# Patient Record
Sex: MALE | Race: BLACK OR AFRICAN AMERICAN | Employment: UNEMPLOYED | URBAN - METROPOLITAN AREA
[De-identification: names, ages, dates, MRNs, and addresses within clinical notes are randomized per-mention and may not be internally consistent; named-entity substitution may affect disease eponyms.]

---

## 2019-09-04 ENCOUNTER — APPOINTMENT (OUTPATIENT)
Dept: CT IMAGING | Facility: HOSPITAL | Age: 34
DRG: 064 | End: 2019-09-04
Attending: EMERGENCY MEDICINE

## 2019-09-04 ENCOUNTER — HOSPITAL ENCOUNTER (INPATIENT)
Facility: HOSPITAL | Age: 34
LOS: 9 days | Discharge: HOME | DRG: 064 | End: 2019-09-14
Attending: EMERGENCY MEDICINE | Admitting: HOSPITALIST

## 2019-09-04 DIAGNOSIS — D75.839 THROMBOCYTOSIS: ICD-10-CM

## 2019-09-04 DIAGNOSIS — G08 CEREBRAL VENOUS SINUS THROMBOSIS: ICD-10-CM

## 2019-09-04 DIAGNOSIS — R47.01 APHASIA: Primary | ICD-10-CM

## 2019-09-04 DIAGNOSIS — I63.9 ISCHEMIC STROKE DIAGNOSED DURING CURRENT ADMISSION (HCC): ICD-10-CM

## 2019-09-04 LAB
ALBUMIN SERPL-MCNC: 3.9 G/DL (ref 3.4–5)
ALBUMIN/GLOB SERPL: 1.1 {RATIO} (ref 1–2)
ALP LIVER SERPL-CCNC: 69 U/L (ref 45–117)
ALT SERPL-CCNC: 35 U/L (ref 16–61)
ANION GAP SERPL CALC-SCNC: 7 MMOL/L (ref 0–18)
APTT PPP: 28.4 SECONDS (ref 25.4–36.1)
AST SERPL-CCNC: 17 U/L (ref 15–37)
BASOPHILS # BLD AUTO: 0.06 X10(3) UL (ref 0–0.2)
BASOPHILS NFR BLD AUTO: 0.6 %
BILIRUB SERPL-MCNC: 0.8 MG/DL (ref 0.1–2)
BILIRUB UR QL STRIP.AUTO: NEGATIVE
BUN BLD-MCNC: 12 MG/DL (ref 7–18)
BUN/CREAT SERPL: 7.9 (ref 10–20)
CALCIUM BLD-MCNC: 9.4 MG/DL (ref 8.5–10.1)
CHLORIDE SERPL-SCNC: 106 MMOL/L (ref 98–112)
CO2 SERPL-SCNC: 26 MMOL/L (ref 21–32)
COLOR UR AUTO: YELLOW
CREAT BLD-MCNC: 1.51 MG/DL (ref 0.7–1.3)
DEPRECATED RDW RBC AUTO: 44.5 FL (ref 35.1–46.3)
EOSINOPHIL # BLD AUTO: 0.03 X10(3) UL (ref 0–0.7)
EOSINOPHIL NFR BLD AUTO: 0.3 %
ERYTHROCYTE [DISTWIDTH] IN BLOOD BY AUTOMATED COUNT: 14.4 % (ref 11–15)
GLOBULIN PLAS-MCNC: 3.7 G/DL (ref 2.8–4.4)
GLUCOSE BLD-MCNC: 113 MG/DL (ref 70–99)
GLUCOSE BLD-MCNC: 94 MG/DL (ref 70–99)
GLUCOSE UR STRIP.AUTO-MCNC: NEGATIVE MG/DL
HCT VFR BLD AUTO: 45.2 % (ref 39–53)
HGB BLD-MCNC: 15.1 G/DL (ref 13–17.5)
IMM GRANULOCYTES # BLD AUTO: 0.03 X10(3) UL (ref 0–1)
IMM GRANULOCYTES NFR BLD: 0.3 %
INR BLD: 1.05 (ref 0.9–1.1)
KETONES UR STRIP.AUTO-MCNC: 20 MG/DL
LEUKOCYTE ESTERASE UR QL STRIP.AUTO: NEGATIVE
LYMPHOCYTES # BLD AUTO: 1.62 X10(3) UL (ref 1–4)
LYMPHOCYTES NFR BLD AUTO: 15.9 %
M PROTEIN MFR SERPL ELPH: 7.6 G/DL (ref 6.4–8.2)
MCH RBC QN AUTO: 28.6 PG (ref 26–34)
MCHC RBC AUTO-ENTMCNC: 33.4 G/DL (ref 31–37)
MCV RBC AUTO: 85.6 FL (ref 80–100)
MONOCYTES # BLD AUTO: 0.91 X10(3) UL (ref 0.1–1)
MONOCYTES NFR BLD AUTO: 8.9 %
NEUTROPHILS # BLD AUTO: 7.55 X10 (3) UL (ref 1.5–7.7)
NEUTROPHILS # BLD AUTO: 7.55 X10(3) UL (ref 1.5–7.7)
NEUTROPHILS NFR BLD AUTO: 74 %
NITRITE UR QL STRIP.AUTO: NEGATIVE
OSMOLALITY SERPL CALC.SUM OF ELEC: 289 MOSM/KG (ref 275–295)
PH UR STRIP.AUTO: 5 [PH] (ref 4.5–8)
PLATELET # BLD AUTO: 579 10(3)UL (ref 150–450)
POTASSIUM SERPL-SCNC: 4.3 MMOL/L (ref 3.5–5.1)
PROT UR STRIP.AUTO-MCNC: 30 MG/DL
PSA SERPL DL<=0.01 NG/ML-MCNC: 14.1 SECONDS (ref 12.5–14.7)
RBC # BLD AUTO: 5.28 X10(6)UL (ref 3.8–5.8)
RBC #/AREA URNS AUTO: >10 /HPF
SODIUM SERPL-SCNC: 139 MMOL/L (ref 136–145)
SP GR UR STRIP.AUTO: 1.02 (ref 1–1.03)
TROPONIN I SERPL-MCNC: <0.045 NG/ML (ref ?–0.04)
UROBILINOGEN UR STRIP.AUTO-MCNC: <2 MG/DL
WBC # BLD AUTO: 10.2 X10(3) UL (ref 4–11)

## 2019-09-04 PROCEDURE — 99223 1ST HOSP IP/OBS HIGH 75: CPT | Performed by: HOSPITALIST

## 2019-09-04 PROCEDURE — 70450 CT HEAD/BRAIN W/O DYE: CPT | Performed by: EMERGENCY MEDICINE

## 2019-09-05 ENCOUNTER — APPOINTMENT (OUTPATIENT)
Dept: MRI IMAGING | Facility: HOSPITAL | Age: 34
DRG: 064 | End: 2019-09-05
Attending: NURSE PRACTITIONER

## 2019-09-05 ENCOUNTER — APPOINTMENT (OUTPATIENT)
Dept: MRI IMAGING | Facility: HOSPITAL | Age: 34
DRG: 064 | End: 2019-09-05
Attending: EMERGENCY MEDICINE

## 2019-09-05 ENCOUNTER — APPOINTMENT (OUTPATIENT)
Dept: CV DIAGNOSTICS | Facility: HOSPITAL | Age: 34
DRG: 064 | End: 2019-09-05
Attending: NURSE PRACTITIONER

## 2019-09-05 LAB
AMPHET UR QL SCN: NEGATIVE
ANION GAP SERPL CALC-SCNC: 8 MMOL/L (ref 0–18)
APTT PPP: 32.9 SECONDS (ref 25.4–36.1)
APTT PPP: 89.2 SECONDS (ref 25.4–36.1)
ATRIAL RATE: 66 BPM
BARBITURATES UR QL SCN: NEGATIVE
BENZODIAZ UR QL SCN: NEGATIVE
BUN BLD-MCNC: 13 MG/DL (ref 7–18)
BUN/CREAT SERPL: 10.4 (ref 10–20)
CALCIUM BLD-MCNC: 8.7 MG/DL (ref 8.5–10.1)
CANNABINOIDS UR QL SCN: NEGATIVE
CHLORIDE SERPL-SCNC: 108 MMOL/L (ref 98–112)
CHOLEST SMN-MCNC: 121 MG/DL (ref ?–200)
CO2 SERPL-SCNC: 26 MMOL/L (ref 21–32)
COCAINE UR QL: NEGATIVE
CREAT BLD-MCNC: 1.25 MG/DL (ref 0.7–1.3)
CREAT UR-SCNC: 293 MG/DL
DEPRECATED RDW RBC AUTO: 45.1 FL (ref 35.1–46.3)
ERYTHROCYTE [DISTWIDTH] IN BLOOD BY AUTOMATED COUNT: 14.2 % (ref 11–15)
EST. AVERAGE GLUCOSE BLD GHB EST-MCNC: 108 MG/DL (ref 68–126)
ETHANOL SERPL-MCNC: <3 MG/DL (ref ?–3)
ETHANOL UR-MCNC: NEGATIVE MG/DL
F2 C.20210G>A GENO BLD/T: NORMAL
F5 P.R506Q BLD/T QL: NORMAL
GLUCOSE BLD-MCNC: 116 MG/DL (ref 70–99)
GLUCOSE BLD-MCNC: 125 MG/DL (ref 70–99)
GLUCOSE BLD-MCNC: 69 MG/DL (ref 70–99)
GLUCOSE BLD-MCNC: 72 MG/DL (ref 70–99)
GLUCOSE BLD-MCNC: 97 MG/DL (ref 70–99)
GLUCOSE BLD-MCNC: 98 MG/DL (ref 70–99)
HBA1C MFR BLD HPLC: 5.4 % (ref ?–5.7)
HCT VFR BLD AUTO: 44.1 % (ref 39–53)
HCYS SERPL-SCNC: 6.2 UMOL/L (ref 3.2–10.7)
HDLC SERPL-MCNC: 54 MG/DL (ref 40–59)
HGB BLD-MCNC: 14.9 G/DL (ref 13–17.5)
LDLC SERPL CALC-MCNC: 56 MG/DL (ref ?–100)
MCH RBC QN AUTO: 29 PG (ref 26–34)
MCHC RBC AUTO-ENTMCNC: 33.8 G/DL (ref 31–37)
MCV RBC AUTO: 85.8 FL (ref 80–100)
NONHDLC SERPL-MCNC: 67 MG/DL (ref ?–130)
OPIATES UR QL SCN: NEGATIVE
OSMOLALITY SERPL CALC.SUM OF ELEC: 296 MOSM/KG (ref 275–295)
P AXIS: 75 DEGREES
P-R INTERVAL: 154 MS
PCP UR QL SCN: NEGATIVE
PLATELET # BLD AUTO: 614 10(3)UL (ref 150–450)
POTASSIUM SERPL-SCNC: 4.4 MMOL/L (ref 3.5–5.1)
Q-T INTERVAL: 388 MS
QRS DURATION: 90 MS
QTC CALCULATION (BEZET): 406 MS
R AXIS: 58 DEGREES
RBC # BLD AUTO: 5.14 X10(6)UL (ref 4.3–5.7)
SODIUM SERPL-SCNC: 142 MMOL/L (ref 136–145)
T AXIS: 60 DEGREES
TRIGL SERPL-MCNC: 53 MG/DL (ref 30–149)
VENTRICULAR RATE: 66 BPM
VLDLC SERPL CALC-MCNC: 11 MG/DL (ref 0–30)
WBC # BLD AUTO: 14.2 X10(3) UL (ref 4–11)

## 2019-09-05 PROCEDURE — 99232 SBSQ HOSP IP/OBS MODERATE 35: CPT | Performed by: HOSPITALIST

## 2019-09-05 PROCEDURE — 93306 TTE W/DOPPLER COMPLETE: CPT | Performed by: NURSE PRACTITIONER

## 2019-09-05 PROCEDURE — 70546 MR ANGIOGRAPH HEAD W/O&W/DYE: CPT | Performed by: NURSE PRACTITIONER

## 2019-09-05 PROCEDURE — 99291 CRITICAL CARE FIRST HOUR: CPT | Performed by: OTHER

## 2019-09-05 PROCEDURE — 99291 CRITICAL CARE FIRST HOUR: CPT | Performed by: NURSE PRACTITIONER

## 2019-09-05 PROCEDURE — 95819 EEG AWAKE AND ASLEEP: CPT | Performed by: OTHER

## 2019-09-05 PROCEDURE — 70544 MR ANGIOGRAPHY HEAD W/O DYE: CPT | Performed by: EMERGENCY MEDICINE

## 2019-09-05 PROCEDURE — 70551 MRI BRAIN STEM W/O DYE: CPT | Performed by: EMERGENCY MEDICINE

## 2019-09-05 PROCEDURE — 70547 MR ANGIOGRAPHY NECK W/O DYE: CPT | Performed by: EMERGENCY MEDICINE

## 2019-09-05 PROCEDURE — 99253 IP/OBS CNSLTJ NEW/EST LOW 45: CPT | Performed by: INTERNAL MEDICINE

## 2019-09-05 RX ORDER — POLYETHYLENE GLYCOL 3350 17 G/17G
17 POWDER, FOR SOLUTION ORAL DAILY PRN
Status: DISCONTINUED | OUTPATIENT
Start: 2019-09-05 | End: 2019-09-14

## 2019-09-05 RX ORDER — FAMOTIDINE 10 MG/ML
20 INJECTION, SOLUTION INTRAVENOUS DAILY
Status: DISCONTINUED | OUTPATIENT
Start: 2019-09-05 | End: 2019-09-05

## 2019-09-05 RX ORDER — LABETALOL HYDROCHLORIDE 5 MG/ML
10 INJECTION, SOLUTION INTRAVENOUS EVERY 10 MIN PRN
Status: DISCONTINUED | OUTPATIENT
Start: 2019-09-05 | End: 2019-09-14

## 2019-09-05 RX ORDER — FAMOTIDINE 20 MG/1
20 TABLET ORAL DAILY
Status: DISCONTINUED | OUTPATIENT
Start: 2019-09-05 | End: 2019-09-05

## 2019-09-05 RX ORDER — HEPARIN SODIUM 5000 [USP'U]/ML
30 INJECTION INTRAVENOUS; SUBCUTANEOUS ONCE
Status: COMPLETED | OUTPATIENT
Start: 2019-09-05 | End: 2019-09-05

## 2019-09-05 RX ORDER — SODIUM PHOSPHATE, DIBASIC AND SODIUM PHOSPHATE, MONOBASIC 7; 19 G/133ML; G/133ML
1 ENEMA RECTAL ONCE AS NEEDED
Status: DISCONTINUED | OUTPATIENT
Start: 2019-09-05 | End: 2019-09-14

## 2019-09-05 RX ORDER — BISACODYL 10 MG
10 SUPPOSITORY, RECTAL RECTAL
Status: DISCONTINUED | OUTPATIENT
Start: 2019-09-05 | End: 2019-09-14

## 2019-09-05 RX ORDER — DEXTROSE AND SODIUM CHLORIDE 5; .9 G/100ML; G/100ML
INJECTION, SOLUTION INTRAVENOUS CONTINUOUS
Status: DISCONTINUED | OUTPATIENT
Start: 2019-09-05 | End: 2019-09-06

## 2019-09-05 RX ORDER — DEXTROSE MONOHYDRATE 25 G/50ML
INJECTION, SOLUTION INTRAVENOUS
Status: COMPLETED
Start: 2019-09-05 | End: 2019-09-05

## 2019-09-05 RX ORDER — MORPHINE SULFATE 4 MG/ML
2 INJECTION, SOLUTION INTRAMUSCULAR; INTRAVENOUS EVERY 2 HOUR PRN
Status: DISCONTINUED | OUTPATIENT
Start: 2019-09-05 | End: 2019-09-14

## 2019-09-05 RX ORDER — DOCUSATE SODIUM 100 MG/1
100 CAPSULE, LIQUID FILLED ORAL 2 TIMES DAILY
Status: DISCONTINUED | OUTPATIENT
Start: 2019-09-05 | End: 2019-09-14

## 2019-09-05 RX ORDER — ASPIRIN 325 MG
325 TABLET ORAL DAILY
Status: DISCONTINUED | OUTPATIENT
Start: 2019-09-05 | End: 2019-09-14

## 2019-09-05 RX ORDER — HEPARIN SODIUM AND DEXTROSE 10000; 5 [USP'U]/100ML; G/100ML
12 INJECTION INTRAVENOUS ONCE
Status: COMPLETED | OUTPATIENT
Start: 2019-09-05 | End: 2019-09-05

## 2019-09-05 RX ORDER — SODIUM CHLORIDE 9 MG/ML
INJECTION, SOLUTION INTRAVENOUS CONTINUOUS
Status: DISCONTINUED | OUTPATIENT
Start: 2019-09-05 | End: 2019-09-05

## 2019-09-05 RX ORDER — METOCLOPRAMIDE HYDROCHLORIDE 5 MG/ML
5 INJECTION INTRAMUSCULAR; INTRAVENOUS EVERY 8 HOURS PRN
Status: DISCONTINUED | OUTPATIENT
Start: 2019-09-05 | End: 2019-09-12

## 2019-09-05 RX ORDER — ONDANSETRON 2 MG/ML
4 INJECTION INTRAMUSCULAR; INTRAVENOUS EVERY 6 HOURS PRN
Status: DISCONTINUED | OUTPATIENT
Start: 2019-09-05 | End: 2019-09-12

## 2019-09-05 RX ORDER — PHENYLEPHRINE HCL IN 0.9% NACL 50MG/250ML
PLASTIC BAG, INJECTION (ML) INTRAVENOUS CONTINUOUS PRN
Status: DISCONTINUED | OUTPATIENT
Start: 2019-09-05 | End: 2019-09-11 | Stop reason: ALTCHOICE

## 2019-09-05 RX ORDER — DEXTROSE MONOHYDRATE 25 G/50ML
50 INJECTION, SOLUTION INTRAVENOUS ONCE
Status: COMPLETED | OUTPATIENT
Start: 2019-09-05 | End: 2019-09-05

## 2019-09-05 RX ORDER — ACETAMINOPHEN 325 MG/1
650 TABLET ORAL EVERY 4 HOURS PRN
Status: DISCONTINUED | OUTPATIENT
Start: 2019-09-05 | End: 2019-09-14

## 2019-09-05 RX ORDER — ACETAMINOPHEN 650 MG/1
650 SUPPOSITORY RECTAL EVERY 4 HOURS PRN
Status: DISCONTINUED | OUTPATIENT
Start: 2019-09-05 | End: 2019-09-14

## 2019-09-05 RX ORDER — MORPHINE SULFATE 4 MG/ML
1 INJECTION, SOLUTION INTRAMUSCULAR; INTRAVENOUS EVERY 2 HOUR PRN
Status: DISCONTINUED | OUTPATIENT
Start: 2019-09-05 | End: 2019-09-14

## 2019-09-05 RX ORDER — HEPARIN SODIUM AND DEXTROSE 10000; 5 [USP'U]/100ML; G/100ML
INJECTION INTRAVENOUS CONTINUOUS
Status: DISCONTINUED | OUTPATIENT
Start: 2019-09-05 | End: 2019-09-14

## 2019-09-05 RX ORDER — ATORVASTATIN CALCIUM 80 MG/1
80 TABLET, FILM COATED ORAL NIGHTLY
Status: DISCONTINUED | OUTPATIENT
Start: 2019-09-05 | End: 2019-09-14

## 2019-09-05 RX ORDER — ASPIRIN 300 MG
300 SUPPOSITORY, RECTAL RECTAL DAILY
Status: DISCONTINUED | OUTPATIENT
Start: 2019-09-05 | End: 2019-09-14

## 2019-09-05 RX ORDER — SENNOSIDES 8.6 MG
17.2 TABLET ORAL NIGHTLY
Status: DISCONTINUED | OUTPATIENT
Start: 2019-09-05 | End: 2019-09-14

## 2019-09-05 NOTE — PROCEDURES
ELECTROENCEPHALOGRAM REPORT      Patient Name: Alfreda Dean  Chart ID: [id number]  Ordering Physician: Herman Weems        Date of Test: 9/5/2019    A routine EEG was obtained. No sedation was given.     DX:APHASIA  HX: A 31YO MALE WHO PRESENTED TO ED

## 2019-09-05 NOTE — ED PROVIDER NOTES
Patient Seen in: [facility] Emergency Department    History   Patient presents with:  Stroke (neurologic)    Stated Complaint: Weakness    HPI    This is a gentleman in his 35s who presents the emergency department for possible stroke.   Patient has patient is tracking finger movements but not answering questions or following commands lungs were clear cardiovascular exam shows rate rhythm abdomen soft nontender symmetrical cyanosis or edema       ED Course     Labs Reviewed   COMP METABOLIC PANEL (14) 9/4/2019 11:10 PM                 Disposition and Plan     Clinical Impression:  Aphasia  (primary encounter diagnosis)    Disposition:  Admit  9/4/2019 11:10 pm    Follow-up:  No follow-up provider specified.       Medications Prescribed:  There are no dis

## 2019-09-05 NOTE — CONSULTS
[facility]  Neurocritical Care       Name: Ra Coffey  MRN: [id number]  Admission Date/Time: 9/4/2019  9:23 PM  Primary Care Provider:  None Pcp        Reason for Consultation:   B/l basal ganglia and thalamic infarcts, possible lio pressure    • Stroke [facility]) 2015 or 2016    From brother in law       History reviewed. No pertinent surgical history.       Medications Prior to Admission:  UNKNOWN TO PATIENT - BLOOD PRESSURE  Disp:  Rfl:  Taking     No Known Allergies  Social History    S (FLEET) 7-19 GM/118ML enema 133 mL 1 enema Rectal Once PRN   ondansetron HCl (ZOFRAN) injection 4 mg 4 mg Intravenous Q6H PRN   Or      Metoclopramide HCl (REGLAN) injection 5 mg 5 mg Intravenous Q8H PRN   famoTIDine (PEPCID) tab 20 mg 20 mg Oral Daily   O COMPARISON:  None. INDICATIONS:  Mental status changes, patient not responding verbally. TECHNIQUE:  Noncontrast CT scanning is performed through the brain. Dose reduction techniques were used.  Dose information is transmitted to the [facility] [employer] 9/04/2019, 21:54. INDICATIONS:  unresponsive patient  TECHNIQUE:  MRI of the brain was performed with multi-planar T1, T2-weighted images with FLAIR sequences and diffusion weighted images without infusion.   MR angiography of the brain without infusion an visible stenosis or aneurysm. POSTERIOR COMMUNICATING: No visible stenosis or aneurysm. BASILAR:             No visible stenosis or aneurysm. INTRACRANIAL VERTEBRALS: No visible significant stenosis or dissection.   MRA NECK COMMON CAROTID: 09/04/2019    ALT 35 09/04/2019    PTT 28.4 09/04/2019    INR 1.05 09/04/2019    TROP <0.045 09/04/2019    ETOH <3 09/04/2019     Recent Labs   Lab 09/04/19 2126 09/05/19  0415   * 125*   BUN 12 13   CREATSERUM 1.51* 1.25   GFRAA 25* 87   GFRNAA 22 Ischemic Stroke Protocol, -160 for permissive HTN, MRV today, dc Keppra. A total of 35 minutes of critical care time (exclusive of billable procedures) was administered to manage and/or prevent neurologic instability.  This involved direct patient

## 2019-09-05 NOTE — PLAN OF CARE
Pt. Arousable at times, doesn't follow commands, vitals stable, MRV completed, family updated on plan of care, no signs of pain. Post 2 hour hypoglycemia accucheck not performed due to MRV.

## 2019-09-05 NOTE — PROGRESS NOTES
[facility]  Progress Note     Cheyenne Pitts Patient Status:  Inpatient    1985 MRN [id number]   [facility] 6NE-A Attending Jen Clark MD   Hosp Day # 0 PCP None Pcp     Chief Complaint: ams    S: Patient unresponsive. 1. AMS; possible venous sinus thrombosis and possible b/l basal ganglia strokes; Awaiting final reads; ASA/heparin/keppra for now.     2. Hx stroke with residual left sided weakness    Quality:  · DVT Prophylaxis: heparin drip  · CODE status: full  · F

## 2019-09-05 NOTE — SLP NOTE
Attempted evaluation, patient not yet appropriate clinically per RN. Will hold and continue to follow.     Machelle Maya Virgil 87 CCC-SLP  Pager [phone]

## 2019-09-05 NOTE — PROGRESS NOTES
09/05/19 1054   Clinical Encounter Type   Visited With Patient not available   Continue Visiting Yes  (Patient was resting quietly. Spiritual Care will follow up later today.    available at pager [phone] if needed.)

## 2019-09-05 NOTE — PROGRESS NOTES
[facility]  Neurocritical Care APRN Progress Note    NAME: Micheline Carrasco - ROOM: C9/C9 - MRN: [id number] - Age: 16 year old - :1841    History Of Present Illness:  Micheline Carrasco is a 16 year old male with PMHx significant f completed. Pertinent positives and negatives noted in the HPI.       OBJECTIVE  Vitals:  /62   Pulse 90   Temp 98.5 °F (36.9 °C) (Temporal)   Resp 12   Ht 188 cm (6' 2\")   Wt 182 lb 15.7 oz (83 kg)   SpO2 96%   BMI 23.49 kg/m²               Physical order set      - Neuro checks Q1h      - NIH daily      - 0.9NS at 125      - Maintain SBP between 100-180      - PRN Cardene, Labetalol, and Levophed to maintain SBP parameters      - Lipid panel.   Restart/Continue Statin, goal LDL<70 and HDL>40      - AS

## 2019-09-05 NOTE — PROGRESS NOTES
Pharmacy Note: Renal dose adjustment for Metoclopramide (Reglan)  Ford Mcneil has been prescribed Metoclopramide (Reglan) 10 mg every 8 hours as needed. Estimated Creatinine Clearance: -28 mL/min (A) (based on SCr of 1.51 mg/dL (H)).     His calculat

## 2019-09-05 NOTE — ED NOTES
Report called to David Brown RN [id number] for room 0892. Pt can proceed to room after MRI has been completed without awaiting test results.

## 2019-09-05 NOTE — ED NOTES
EDRN accompanied patient out of department for testing. Family accompanies me, sister in law, and signs waiver with MRI tech stating that patient does not have an implanted pacemaker.  Pt shifts in bed after moving from one cart to another, but does not ope

## 2019-09-05 NOTE — H&P
[facility]  History and Physical     Miskael Mishack Patient Status:  Emergency    1841 MRN [id number]   Location [address] Attending Jagdeep Menon MD   Hosp Day # 0 PCP No primary care provider on file. or gallops. Equal pulses. Chest and Back: No tenderness or deformity. Abdomen: Soft, nontender, nondistended. Positive bowel sounds. No rebound, guarding or organomegaly. Neurologic: No focal neurological deficits. CNII-XII grossly intact.   Musculoske

## 2019-09-05 NOTE — ED INITIAL ASSESSMENT (HPI)
Patient arrives from brothers home with c/o AMS. Per EMS last known normal was last night between 2100 and 2200. Family stated patient was \"out of it\" this am and around 2000 noted no verbal response and left sided weakness. Hx.  Of stroke 1 year ago

## 2019-09-05 NOTE — OCCUPATIONAL THERAPY NOTE
Attempted to see pt this AM, pt on Bed Rest at this time per stroke protocol. OT will follow up tomorrow.

## 2019-09-05 NOTE — CONSULTS
[facility]  Report of Consultation    Leeann Kwno Patient Status:  Inpatient    1985 MRN [id number]   [facility] 6NE-A Attending Sue Sorto MD   Hosp Day # 0 PCP None Pcp     Reason for Consultation:  Acute bilateral basa Nightly  •  aspirin 300 MG rectal suppository 300 mg, 300 mg, Rectal, Daily **OR** aspirin tab 325 mg, 325 mg, Oral, Daily  •  Senna (SENOKOT) tab 17.2 mg, 17.2 mg, Oral, Nightly  •  docusate sodium (COLACE) cap 100 mg, 100 mg, Oral, BID  •  PEG 3350 (BLAINE up neg thus far. 2. Htn- appears to take Nifedipine at home however non-compliant with taking per family. He is normotensive at present  3. Lipids- LDL 56  4. ASHANTI- resolved  5.  Thrombocytosis- plts elevated at 614k    Plan:    · Review echo just completed

## 2019-09-05 NOTE — PHYSICAL THERAPY NOTE
Physical therapy consult requested. Attempted to see pt at this time, but the pt remains on bedrest.  Will plan to re-attempt to see pt again as activity restrictions are lifted.

## 2019-09-06 ENCOUNTER — APPOINTMENT (OUTPATIENT)
Dept: GENERAL RADIOLOGY | Facility: HOSPITAL | Age: 34
DRG: 064 | End: 2019-09-06
Attending: Other

## 2019-09-06 LAB
ANION GAP SERPL CALC-SCNC: 3 MMOL/L (ref 0–18)
APTT PPP: 31 SECONDS (ref 25.4–36.1)
APTT PPP: 64.7 SECONDS (ref 25.4–36.1)
APTT PPP: 72.1 SECONDS (ref 25.4–36.1)
BASOPHILS # BLD AUTO: 0.05 X10(3) UL (ref 0–0.2)
BASOPHILS NFR BLD AUTO: 0.6 %
BETA 2 GLYCOPROTEIN 1 AB, IGG: <3.7 U/ML (ref ?–15)
BETA 2 GLYCOPROTEIN 1 AB, IGM: <3.7 U/ML (ref ?–15)
BILIRUB UR QL STRIP.AUTO: NEGATIVE
BUN BLD-MCNC: 8 MG/DL (ref 7–18)
BUN/CREAT SERPL: 6.3 (ref 10–20)
CALCIUM BLD-MCNC: 8.8 MG/DL (ref 8.5–10.1)
CHLORIDE SERPL-SCNC: 109 MMOL/L (ref 98–112)
CLARITY UR REFRACT.AUTO: CLEAR
CO2 SERPL-SCNC: 32 MMOL/L (ref 21–32)
COLOR UR AUTO: YELLOW
CORTIS SERPL-MCNC: 12.5 UG/DL
CREAT BLD-MCNC: 1.26 MG/DL (ref 0.7–1.3)
DEPRECATED RDW RBC AUTO: 43.8 FL (ref 35.1–46.3)
DRVVT LUPUS ANTICOAGULANT: NEGATIVE
DRVVT SCREEN RATIO: 0.93 (ref 0–1.29)
EOSINOPHIL # BLD AUTO: 0.04 X10(3) UL (ref 0–0.7)
EOSINOPHIL NFR BLD AUTO: 0.5 %
ERYTHROCYTE [DISTWIDTH] IN BLOOD BY AUTOMATED COUNT: 14 % (ref 11–15)
GLUCOSE BLD-MCNC: 107 MG/DL (ref 70–99)
GLUCOSE BLD-MCNC: 120 MG/DL (ref 70–99)
GLUCOSE BLD-MCNC: 129 MG/DL (ref 70–99)
GLUCOSE BLD-MCNC: 66 MG/DL (ref 70–99)
GLUCOSE BLD-MCNC: 66 MG/DL (ref 70–99)
GLUCOSE BLD-MCNC: 73 MG/DL (ref 70–99)
GLUCOSE BLD-MCNC: 80 MG/DL (ref 70–99)
GLUCOSE BLD-MCNC: 81 MG/DL (ref 70–99)
GLUCOSE BLD-MCNC: 81 MG/DL (ref 70–99)
GLUCOSE BLD-MCNC: 83 MG/DL (ref 70–99)
GLUCOSE BLD-MCNC: 85 MG/DL (ref 70–99)
GLUCOSE BLD-MCNC: 88 MG/DL (ref 70–99)
GLUCOSE BLD-MCNC: 95 MG/DL (ref 70–99)
GLUCOSE UR STRIP.AUTO-MCNC: NEGATIVE MG/DL
HCT VFR BLD AUTO: 43.4 % (ref 39–53)
HGB BLD-MCNC: 14.6 G/DL (ref 13–17.5)
IMM GRANULOCYTES # BLD AUTO: 0.03 X10(3) UL (ref 0–1)
IMM GRANULOCYTES NFR BLD: 0.4 %
KETONES UR STRIP.AUTO-MCNC: NEGATIVE MG/DL
LEUKOCYTE ESTERASE UR QL STRIP.AUTO: NEGATIVE
LYMPHOCYTES # BLD AUTO: 1.6 X10(3) UL (ref 1–4)
LYMPHOCYTES NFR BLD AUTO: 19.3 %
MCH RBC QN AUTO: 28.8 PG (ref 26–34)
MCHC RBC AUTO-ENTMCNC: 33.6 G/DL (ref 31–37)
MCV RBC AUTO: 85.6 FL (ref 80–100)
MONOCYTES # BLD AUTO: 0.85 X10(3) UL (ref 0.1–1)
MONOCYTES NFR BLD AUTO: 10.2 %
NEUTROPHILS # BLD AUTO: 5.74 X10 (3) UL (ref 1.5–7.7)
NEUTROPHILS # BLD AUTO: 5.74 X10(3) UL (ref 1.5–7.7)
NEUTROPHILS NFR BLD AUTO: 69 %
NITRITE UR QL STRIP.AUTO: NEGATIVE
OSMOLALITY SERPL CALC.SUM OF ELEC: 297 MOSM/KG (ref 275–295)
OSMOLALITY SERPL: 285 MOSM/KG (ref 280–300)
OSMOLALITY UR: 403 MOSM/KG (ref 300–1300)
PH UR STRIP.AUTO: 6 [PH] (ref 4.5–8)
PHOSPHOLIPID AB, IGG: NEGATIVE
PHOSPHOLIPID AB, IGM: NEGATIVE
PLATELET # BLD AUTO: 506 10(3)UL (ref 150–450)
POTASSIUM SERPL-SCNC: 3.8 MMOL/L (ref 3.5–5.1)
PROT UR STRIP.AUTO-MCNC: NEGATIVE MG/DL
PT(LUPUS): 14.3 SECONDS (ref 12.5–14.7)
RBC # BLD AUTO: 5.07 X10(6)UL (ref 4.3–5.7)
SODIUM SERPL-SCNC: 144 MMOL/L (ref 136–145)
SODIUM SERPL-SCNC: 155 MMOL/L
SP GR UR STRIP.AUTO: 1.01 (ref 1–1.03)
STACLOT LA DELTA: 2.3 SECONDS (ref ?–8)
STACLOT LA: NEGATIVE
UROBILINOGEN UR STRIP.AUTO-MCNC: <2 MG/DL
WBC # BLD AUTO: 8.3 X10(3) UL (ref 4–11)

## 2019-09-06 PROCEDURE — 71045 X-RAY EXAM CHEST 1 VIEW: CPT | Performed by: OTHER

## 2019-09-06 PROCEDURE — 99232 SBSQ HOSP IP/OBS MODERATE 35: CPT | Performed by: HOSPITALIST

## 2019-09-06 PROCEDURE — 99232 SBSQ HOSP IP/OBS MODERATE 35: CPT | Performed by: INTERNAL MEDICINE

## 2019-09-06 PROCEDURE — 99291 CRITICAL CARE FIRST HOUR: CPT | Performed by: OTHER

## 2019-09-06 RX ORDER — DEXTROSE MONOHYDRATE 25 G/50ML
INJECTION, SOLUTION INTRAVENOUS
Status: COMPLETED
Start: 2019-09-06 | End: 2019-09-06

## 2019-09-06 RX ORDER — DEXTROSE MONOHYDRATE 25 G/50ML
50 INJECTION, SOLUTION INTRAVENOUS AS NEEDED
Status: DISCONTINUED | OUTPATIENT
Start: 2019-09-06 | End: 2019-09-14

## 2019-09-06 RX ORDER — DEXTROSE MONOHYDRATE 25 G/50ML
50 INJECTION, SOLUTION INTRAVENOUS
Status: DISCONTINUED | OUTPATIENT
Start: 2019-09-06 | End: 2019-09-14

## 2019-09-06 RX ORDER — MODAFINIL 100 MG/1
100 TABLET ORAL 2 TIMES DAILY
Status: DISCONTINUED | OUTPATIENT
Start: 2019-09-06 | End: 2019-09-07

## 2019-09-06 RX ORDER — DEXTROSE MONOHYDRATE 100 MG/ML
INJECTION, SOLUTION INTRAVENOUS CONTINUOUS
Status: DISCONTINUED | OUTPATIENT
Start: 2019-09-06 | End: 2019-09-07

## 2019-09-06 RX ORDER — WARFARIN SODIUM 5 MG/1
5 TABLET ORAL
Status: COMPLETED | OUTPATIENT
Start: 2019-09-06 | End: 2019-09-06

## 2019-09-06 NOTE — OCCUPATIONAL THERAPY NOTE
OCCUPATIONAL THERAPY EVALUATION - INPATIENT     Room Number: 6205/6709-V  Evaluation Date: 9/6/2019  Type of Evaluation: Initial  Presenting Problem: Aphasia    Physician Order: IP Consult to Occupational Therapy  Reason for Therapy: ADL/IADL Dysfunction a details about PLOF or home environment. Per chart pt visit from [facility]. Pt is normally active playing soccer. SUBJECTIVE   No subject provided. Patient self-stated goal is TBD.       OBJECTIVE  Precautions: (-180)  Fall Risk: High fall NEUROLOGICAL FINDINGS  Neurological Findings:  Tone           Tone: (2 on the Modified Ty Scale L UE. )    ACTIVITY TOLERANCE                         O2 SATURATIONS                ACTIVITIES OF DAILY LIVING ASSESSMENT  AM-PAC ‘ MMT, and Modified Ty Scale.  In this OT evaluation patient presents with the following performance deficits:  R neglect, UE strength, coordination, balance, insight into deficits, safety, sustained/divided attention, problem solving, flexible thinking education;Patient/Family training;Equipment eval/education; Neuromuscluar reeducation; Fine motor coordination activities; Compensatory technique education;UE splinting;Continued evaluation  Rehab Potential : Good  Frequency (Obs): Daily  Number of Visits to

## 2019-09-06 NOTE — PLAN OF CARE
Problem: Impaired Swallowing  Goal: Minimize aspiration risk  Description  Interventions:  - NPO  - Oral Care   Outcome: Progressing

## 2019-09-06 NOTE — SLP NOTE
Attempted evaluation, patient mental status waxes and wanes and not yet appropriate for evaluation. Discussed with RN. Will continue to follow peripherally and complete evaluation as clinically appropriate.     Addendum 11:31: Received call from GUILLERMO hines

## 2019-09-06 NOTE — PROGRESS NOTES
09/06/19 0918   Clinical Encounter Type   Visited With Patient not available   Routine Visit Follow-up   Continue Visiting   ( remain available at the pager# [phone])    attempted to visit couple of times; however, patient sleeping and fami

## 2019-09-06 NOTE — CONSULTS
Pharmacy Dosing Service: Warfarin (Coumadin)    Alka Polk is a 35year old male with cerebral sinus thromboses and b/l thalamic/basal ganglia strokes. Currently on IV heparin. Coumadin to start tonight. Pharmacy consulted to dose. Goal INR 2-3.

## 2019-09-06 NOTE — PHYSICAL THERAPY NOTE
PHYSICAL THERAPY EVALUATION - INPATIENT     Room Number: 5859/6753-G  Evaluation Date: 9/6/2019  Type of Evaluation: Initial  Physician Order: PT Eval and Treat    Presenting Problem: B basal ganglia thrombus  Reason for Therapy: Mobility Dysfunction environment. Per chart pt visit from [facility]. Pt is normally active playing soccer. SUBJECTIVE  Whispering or mouthing answers at times, appropriate when given time to respond. Patient self-stated goal is - unable to state at this time.     OB Turning over in bed (including adjusting bedclothes, sheets and blankets)?: A Lot   -   Sitting down on and standing up from a chair with arms (e.g., wheelchair, bedside commode, etc.): Unable   -   Moving from lying on back to sitting on the side of the b bed;Needs met;Call light within reach;RN aware of session/findings; All patient questions and concerns Bud Solomon aware of eval session)    ASSESSMENT   Patient is a 35year old male admitted on 9/4/2019 for B basal ganglia thrombus.   Pertinent com BSC at assistance level: moderate assistance     Goal #3 Patient is able to sit on eob for 5 min w/ supervision assist.     Goal #4    Goal #5    Goal #6    Goal Comments: Goals established on 9/6/2019

## 2019-09-06 NOTE — SLP NOTE
ADULT SWALLOWING EVALUATION    ASSESSMENT    ASSESSMENT/OVERALL IMPRESSION:  Patient seen for swallowing evaluation per stroke protocol. Patient admitted due to family noting he was \"out of it\", no verbal response and left sided weakness.  He does have a From brother in law          Diet Prior to Admission: Regular; Thin liquids  Precautions: Aspiration    Patient/Family Goals: None stated    SWALLOWING HISTORY  Current Diet Consistency: NPO  Dysphagia History: unknown  Imaging Results:   MRV from 9/5/19 re Dictated by: Priscila Dumont MD on 9/05/2019 at 8:02       Approved by: Priscila Dumont MD       SUBJECTIVE       OBJECTIVE   ORAL MOTOR EXAMINATION  Dentition: Functional  Symmetry: Unable to assess  Strength: Unable to assess  Tone: Unable to assess  Ran

## 2019-09-06 NOTE — PROGRESS NOTES
[facility]  Progress Note     Gregor Mora Patient Status:  Inpatient    1985 MRN [id number]   [facility] 6NE-A Attending Estrellita Khan MD   Hosp Day # 1 PCP None Pcp     Chief Complaint: ams    S: Patient unresponsive. sodium  100 mg Oral BID       ASSESSMENT / PLAN:     1. Cerebral sinus thromboses and b/l thalamic/basal ganglia strokes; ASA/statin/heparin drip; echo reviewed; possible embolic strokes. Cards and neuro following; monitor in cnicu  2.  Hx stroke with resi

## 2019-09-06 NOTE — DIETARY NOTE
NUTRITION INITIAL ASSESSMENT    Pt is at moderate nutrition risk. Pt does not meet malnutrition criteria. NUTRITION DIAGNOSIS/PROBLEM:    Inadequate oral intake related to inability to consume adequate energy as evidenced by NPO, s/p stroke.      Jone Birch

## 2019-09-06 NOTE — PROGRESS NOTES
[facility]  Neurocritical Care       Subjective: Alfreda Dean is a(n) 35year old male s/p venous b/l thalamus and BG infarcts form CVT, remains somnolent.     Review of Systems    Constitutional:    Denies unusual weight loss or weig of Radiology) NRDR ([address]) which includes the Dose Index Registry. PATIENT STATED HISTORY: (As transcribed by Technologist)  Patient is awake but not responding verbally. Birthday and history unknown.     FINDINGS:  VENTRICLES/GOMEZ performed using NASCET criteria. FINDINGS:  Thrombus is seen in the straight sinus, basal veins of the Ady and portions of the bilateral internal cerebral veins. There is also a small amount nonocclusive thrombus noted within the left jugular bulb. in this exam were performed using NASCET criteria. PATIENT STATED HISTORY: (As transcribed by Technologist)  Somewhat unresponsive patient, but moving legs. History of stroke.     FINDINGS:  MRI BRAIN INTRACRANIAL:  There is restricted diffusion involving significant stenosis or dissection. CONCLUSION:  There is restricted diffusion involving the left basal ganglia including the caudate and the left thalamus with edema and additional restricted diffusion involving the right thalamus.   There is some ser  142 144   K 4.3 4.4 3.8    108 109   CO2 26.0 26.0 32.0   ALKPHO 69  --   --    AST 17  --   --    ALT 35  --   --    BILT 0.8  --   --    TP 7.6  --   --        Medications:     Current Facility-Administered Medications:  dextrose 50 % inj injection 4 mg 4 mg Intravenous Q6H PRN   Or      Metoclopramide HCl (REGLAN) injection 5 mg 5 mg Intravenous Q8H PRN   heparin (PORCINE) drip [id number]/250mL infusion CONTINUOUS 200-3,000 Units/hr Intravenous Continuous       Assesment/Plan:   Principal 7gm/dl  Endocrine:  · Hypoglycemia, will consult Endo, right now on D10 was also given D50 x 2  Skin:  · Monitor for skin breakdown.   DVT Prophylaxis:  · SCD’s     Goals of the Day: Ischemic Stroke Protocol, -180 for permissive HTN, pt/ot and speech

## 2019-09-06 NOTE — PLAN OF CARE
Assumed care of Earnest @ approximately 1910: Q1H neuro checks, initially obtunded, requiring vigorous stimulation to open his eyes, waxing and waning over night, occasionally attempting to sit up and speak, but then returning to somnolence; @ approximatel effectiveness of antiarrhythmic and heart rate control medications as ordered  - Initiate emergency measures for life threatening arrhythmias  - Monitor electrolytes and administer replacement therapy as ordered  Outcome: Progressing     Problem: RESPIRATO renal function maintained  Description  INTERVENTIONS:  - Monitor labs and assess for signs and symptoms of volume excess or deficit  - Monitor intake, output and patient weight  - Monitor urine specific gravity, serum osmolarity and serum sodium as indica

## 2019-09-06 NOTE — PROGRESS NOTES
[facility]  Progress Note    Denia Staley Patient Status:  Inpatient    1985 MRN [id number]   [facility] 6NE-A Attending Ismael Pennington MD   Hosp Day # 1 PCP None Pcp       Subjective:  Opens eyes to name but then closes them (09/06/19 0800)       Assessment:       1. Acute recurrent CVA (hx of 2 prior strokes, one of which was reportedly hemorrhagic)- remains aphasic but slightly more responsive. No afib by review of tele. Echo unremarkable. Remains on IV heparin per neuro.  Co

## 2019-09-07 LAB
ANION GAP SERPL CALC-SCNC: 8 MMOL/L (ref 0–18)
APTT PPP: 54.5 SECONDS (ref 25.4–36.1)
BUN BLD-MCNC: 5 MG/DL (ref 7–18)
BUN/CREAT SERPL: 3.9 (ref 10–20)
CALCIUM BLD-MCNC: 9.4 MG/DL (ref 8.5–10.1)
CHLORIDE SERPL-SCNC: 103 MMOL/L (ref 98–112)
CO2 SERPL-SCNC: 27 MMOL/L (ref 21–32)
CREAT BLD-MCNC: 1.27 MG/DL (ref 0.7–1.3)
GLUCOSE BLD-MCNC: 72 MG/DL (ref 70–99)
GLUCOSE BLD-MCNC: 81 MG/DL (ref 70–99)
GLUCOSE BLD-MCNC: 89 MG/DL (ref 70–99)
GLUCOSE BLD-MCNC: 90 MG/DL (ref 70–99)
GLUCOSE BLD-MCNC: 93 MG/DL (ref 70–99)
GLUCOSE BLD-MCNC: 93 MG/DL (ref 70–99)
GLUCOSE BLD-MCNC: 97 MG/DL (ref 70–99)
HAV IGM SER QL: 1.8 MG/DL (ref 1.6–2.6)
INR BLD: 1.05 (ref 0.9–1.1)
INR BLD: 1.11 (ref 0.9–1.1)
OSMOLALITY SERPL CALC.SUM OF ELEC: 283 MOSM/KG (ref 275–295)
PHOSPHATE SERPL-MCNC: 3.4 MG/DL (ref 2.5–4.9)
PLATELET # BLD AUTO: 522 10(3)UL (ref 150–450)
POTASSIUM SERPL-SCNC: 3.8 MMOL/L (ref 3.5–5.1)
PSA SERPL DL<=0.01 NG/ML-MCNC: 14.1 SECONDS (ref 12.5–14.7)
PSA SERPL DL<=0.01 NG/ML-MCNC: 14.8 SECONDS (ref 12.5–14.7)
SODIUM SERPL-SCNC: 138 MMOL/L (ref 136–145)

## 2019-09-07 PROCEDURE — 99232 SBSQ HOSP IP/OBS MODERATE 35: CPT | Performed by: HOSPITALIST

## 2019-09-07 PROCEDURE — 99223 1ST HOSP IP/OBS HIGH 75: CPT | Performed by: SPECIALIST

## 2019-09-07 PROCEDURE — 99233 SBSQ HOSP IP/OBS HIGH 50: CPT | Performed by: OTHER

## 2019-09-07 RX ORDER — HYDROXYUREA 500 MG/1
1000 CAPSULE ORAL DAILY
Status: DISCONTINUED | OUTPATIENT
Start: 2019-09-07 | End: 2019-09-07

## 2019-09-07 RX ORDER — MODAFINIL 100 MG/1
100 TABLET ORAL 2 TIMES DAILY
Status: COMPLETED | OUTPATIENT
Start: 2019-09-07 | End: 2019-09-09

## 2019-09-07 RX ORDER — MAGNESIUM OXIDE 400 MG (241.3 MG MAGNESIUM) TABLET
400 TABLET ONCE
Status: COMPLETED | OUTPATIENT
Start: 2019-09-07 | End: 2019-09-07

## 2019-09-07 RX ORDER — WARFARIN SODIUM 5 MG/1
5 TABLET ORAL
Status: COMPLETED | OUTPATIENT
Start: 2019-09-07 | End: 2019-09-07

## 2019-09-07 RX ORDER — SODIUM CHLORIDE 9 MG/ML
INJECTION, SOLUTION INTRAVENOUS CONTINUOUS
Status: DISCONTINUED | OUTPATIENT
Start: 2019-09-07 | End: 2019-09-13

## 2019-09-07 NOTE — CONSULTS
[facility]  Report of Consultation    Alyse Simon Patient Status:  Inpatient    1985 MRN [id number]   [facility] 6NE-A Attending Sierra Kim MD   Hosp Day # 2 PCP None Pcp     Reason for Consultation:  hypoglycemia Oral, Q15 Min PRN **OR** Glucose-Vitamin C (DEX-4) chewable tab 8 tablet, 8 tablet, Oral, Q15 Min PRN  •  modafinil (PROVIGIL) tab 100 mg, 100 mg, Oral, BID  •  acetaminophen (TYLENOL) tab 650 mg, 650 mg, Oral, Q4H PRN **OR** acetaminophen (TYLENOL) 650 MG kg/m².   General:  No acute distress, unresponsive  Eyes: anicteric sclera  ENT: op clear,  Neck: supple, no thyromegaly or nodules palpable  Lymph: no neck or supraclavicular lymphadenopathy  Cardiovascular:  RRR; no murmurs 2+ pedal pulses  Lungs: CTAB, n

## 2019-09-07 NOTE — PLAN OF CARE
0800 Pt more awake, follows some commands now. Des see neuro flow sheet. Endo consulted for low blood sugars. Dobhoff place for tube feeds xray x2 for placement. Nutrition consult, called Dr Dianne Cruz for consent of recommendations.  Wife called  stating s

## 2019-09-07 NOTE — CONSULTS
[facility] Report of Consultation      Patient Name: Cayden Bee   YOB: 1985  Medical Record Number: [id number]  Consulting Physician: Aiden Abrams. Jagdeep Conte M.D.    Referring Physician: Lele Lee MD    Date of Cons tablet Oral Q15 Min PRN   [COMPLETED] Warfarin Sodium (COUMADIN) tab 5 mg 5 mg Oral Once at night   modafinil (PROVIGIL) tab 100 mg 100 mg Oral BID   acetaminophen (TYLENOL) tab 650 mg 650 mg Oral Q4H PRN   Or      acetaminophen (TYLENOL) 650 MG rectal sup chloride 0.9% IV bolus 1,000 mL 1,000 mL Intravenous Once   [COMPLETED] levETIRAcetam (KEPPRA) 1,000 mg in sodium chloride 0.9% 100 mL IVPB 1,000 mg Intravenous Once       Allergies   Mr. Katie Miller has No Known Allergies.        Review of Systems   Unable to 0.70 - 1.30 mg/dL    BUN/CREA Ratio 7.9 (L) 10.0 - 20.0    Calcium, Total 9.4 8.5 - 10.1 mg/dL    Calculated Osmolality 289 275 - 295 mOsm/kg    GFR, Non- 22 (L) >=60    GFR, -American 25 (L) >=60    AST 17 15 - 37 U/L    ALT 35 16 - 66 BPM    P-R Interval 154 ms    QRS Duration 90 ms    Q-T Interval 388 ms    QTC Calculation (Bezet) 406 ms    P Axis 75 degrees    R Axis 58 degrees    T Axis 60 degrees   URINALYSIS WITH CULTURE REFLEX    Collection Time: 09/04/19 10:40 PM   Result Valu Time: 09/05/19  4:15 AM   Result Value Ref Range    Cholesterol, Total 121 <200 mg/dL    HDL Cholesterol 54 40 - 59 mg/dL    Triglycerides 53 30 - 149 mg/dL    LDL Cholesterol 56 <100 mg/dL    VLDL 11 0 - 30 mg/dL    Non HDL Chol 67 <130 mg/dL   BASIC META Glycoprotein 1 Ab, IgG <3.7 <=15.0 U/mL   FACTOR V LEIDEN MUTATION    Collection Time: 09/05/19  6:50 AM   Result Value Ref Range    Factor V Leiden Mutation Normal Normal    Reviewed By: Roseline Barkley M.D.     FACTOR II (PROTHROMB)DNA ANALYSIS    Collect Time: 09/05/19  6:57 PM   Result Value Ref Range    PTT 89.2 (H) 25.4 - 36.1 seconds   POCT GLUCOSE    Collection Time: 09/06/19 12:16 AM   Result Value Ref Range    POC Glucose 66 (L) 70 - 99 mg/dL   POCT GLUCOSE    Collection Time: 09/06/19 12:39 AM   Re Basophil Absolute 0.05 0.00 - 0.20 x10(3) uL    Immature Granulocyte Absolute 0.03 0.00 - 1.00 x10(3) uL    Neutrophil % 69.0 %    Lymphocyte % 19.3 %    Monocyte % 10.2 %    Eosinophil % 0.5 %    Basophil % 0.6 %    Immature Granulocyte % 0.4 %   PROTH 99 mg/dL   POCT GLUCOSE    Collection Time: 09/06/19  4:58 PM   Result Value Ref Range    POC Glucose 66 (L) 70 - 99 mg/dL   POCT GLUCOSE    Collection Time: 09/06/19  5:23 PM   Result Value Ref Range    POC Glucose 95 70 - 99 mg/dL   POCT GLUCOSE    Gely Mendoza Range    PTT 54.5 (H) 25.4 - 36.1 seconds   PROTHROMBIN TIME (PT)    Collection Time: 09/07/19  4:52 AM   Result Value Ref Range    PT 14.1 12.5 - 14.7 seconds    INR 1.05 0.90 - 1.10   POCT GLUCOSE    Collection Time: 09/07/19  6:54 AM   Result Value Ref involving the right posterior frontal parietal lobe consistent with old infarction. VENTRICLES/SULCI:   No midline shift. Ex vacuo dilatation of the posterior horn of the left lateral ventricle. Ottie Clack   SINUSES:       Opacification of the left maxillary sinus communicated to nurse Rich Pierre by Vision radiologist on 9/5/2019 at 0225 hours. Dictated by: James Ortez MD on 9/05/2019 at 8:02       Approved by: James Ortze MD         Impression and Plan   1.  CVA: Patient on aspirin and anticoagulation as p

## 2019-09-07 NOTE — PLAN OF CARE
Pt is often drowsy, opens eyes with repeated stimulation. Makes eye contact usually, does not track well. Moves all extremities. Once, spoke name. No other speech noted. No commands followed. So far, 100% of u/o captured c condom catheter.

## 2019-09-07 NOTE — PROGRESS NOTES
[facility] Dosing Service  Warfarin (Coumadin) Subsequent Dosing    Ron Noble is a 35year old male for whom pharmacy has been dosing warfarin (Coumadin).  Goal INR is 2-3    Recent Labs   Lab 09/04/19  2126 09/06/19  0420 09/07/19  0452   INR 1.05

## 2019-09-07 NOTE — PROGRESS NOTES
[facility]  Neurocritical Care       Subjective: Newton Hatchet is a(n) 35year old male s/p venous b/l thalamus and BG infarcts form CVT, remains somnolent. 9/7/2019 - Awake and follows commands.     Review of Systems    [place] Registry) which includes the Dose Index Registry. PATIENT STATED HISTORY: (As transcribed by Technologist)  Patient is awake but not responding verbally. Birthday and history unknown. FINDINGS:  VENTRICLES/SULCI:  See below.  INTRACRANIAL:  There is an Thrombus is seen in the straight sinus, basal veins of the Ady and portions of the bilateral internal cerebral veins. There is also a small amount nonocclusive thrombus noted within the left jugular bulb.   There is patchy enhancement noted in the bi criteria. PATIENT STATED HISTORY: (As transcribed by Technologist)  Somewhat unresponsive patient, but moving legs. History of stroke.     FINDINGS:  MRI BRAIN INTRACRANIAL:  There is restricted diffusion involving the medial and superior left basal gangl CONCLUSION:  There is restricted diffusion involving the left basal ganglia including the caudate and the left thalamus with edema and additional restricted diffusion involving the right thalamus.   There is some serpiginous T2 star hypo intensity in th 85   GFRNAA 22* 75 74 74   CA 9.4 8.7 8.8 9.4   ALB 3.9  --   --   --     142 144 138   K 4.3 4.4 3.8 3.8    108 109 103   CO2 26.0 26.0 32.0 27.0   ALKPHO 69  --   --   --    AST 17  --   --   --    ALT 35  --   --   --    BILT 0.8  --   -- FLEET ENEMA (FLEET) 7-19 GM/118ML enema 133 mL 1 enema Rectal Once PRN   ondansetron HCl (ZOFRAN) injection 4 mg 4 mg Intravenous Q6H PRN   Or      Metoclopramide HCl (REGLAN) injection 5 mg 5 mg Intravenous Q8H PRN   heparin (PORCINE) drip [id number]/25 · IV Fluids      GI:  · GI Prophylaxis  · Bowel Regimen      Heme/ID:  · Monitor H/H with goal hemoglobin more than 7gm/dl  Endocrine:  · Hypoglycemia, will consult Endo, right now on D10  Skin:  · Monitor for skin breakdown.   DVT Prophylaxis:  · SCD’s

## 2019-09-07 NOTE — SLP NOTE
SPEECH DAILY NOTE - INPATIENT    ASSESSMENT & PLAN   ASSESSMENT  Pt seen this afternoon for bedside swallow re-evaluation. Nurse approved session. Pt's two friends present. Pt awake, sitting upright in chair.  Pt unable to answer questions due to being apho

## 2019-09-07 NOTE — PROGRESS NOTES
[facility]  Progress Note     Aditi Books Patient Status:  Inpatient    1985 MRN [id number]   [facility] 6NE-A Attending Bernhard Severance, MD   Hosp Day # 2 PCP None Pcp     Chief Complaint: ams    S: Patient unresponsive at Imaging: Imaging data reviewed in Epic.     Medications:   • modafinil  100 mg Oral BID   • atorvastatin  80 mg Oral Nightly   • aspirin  300 mg Rectal Daily    Or   • aspirin  325 mg Oral Daily   • Senna  17.2 mg Oral Nightly   • docusate sodium

## 2019-09-08 LAB
ANION GAP SERPL CALC-SCNC: 7 MMOL/L (ref 0–18)
APTT PPP: 47.3 SECONDS (ref 25.4–36.1)
APTT PPP: 56 SECONDS (ref 25.4–36.1)
BASOPHILS # BLD AUTO: 0.04 X10(3) UL (ref 0–0.2)
BASOPHILS NFR BLD AUTO: 0.6 %
BUN BLD-MCNC: 8 MG/DL (ref 7–18)
BUN/CREAT SERPL: 6.7 (ref 10–20)
CALCIUM BLD-MCNC: 9.1 MG/DL (ref 8.5–10.1)
CHLORIDE SERPL-SCNC: 104 MMOL/L (ref 98–112)
CO2 SERPL-SCNC: 29 MMOL/L (ref 21–32)
CREAT BLD-MCNC: 1.19 MG/DL (ref 0.7–1.3)
DEPRECATED RDW RBC AUTO: 42 FL (ref 35.1–46.3)
EOSINOPHIL # BLD AUTO: 0.06 X10(3) UL (ref 0–0.7)
EOSINOPHIL NFR BLD AUTO: 0.9 %
ERYTHROCYTE [DISTWIDTH] IN BLOOD BY AUTOMATED COUNT: 13.4 % (ref 11–15)
GLUCOSE BLD-MCNC: 104 MG/DL (ref 70–99)
GLUCOSE BLD-MCNC: 85 MG/DL (ref 70–99)
GLUCOSE BLD-MCNC: 85 MG/DL (ref 70–99)
GLUCOSE BLD-MCNC: 90 MG/DL (ref 70–99)
GLUCOSE BLD-MCNC: 92 MG/DL (ref 70–99)
HAV IGM SER QL: 1.9 MG/DL (ref 1.6–2.6)
HCT VFR BLD AUTO: 44.7 % (ref 39–53)
HGB BLD-MCNC: 15.2 G/DL (ref 13–17.5)
IMM GRANULOCYTES # BLD AUTO: 0.02 X10(3) UL (ref 0–1)
IMM GRANULOCYTES NFR BLD: 0.3 %
INR BLD: 1.04 (ref 0.9–1.1)
LYMPHOCYTES # BLD AUTO: 1.35 X10(3) UL (ref 1–4)
LYMPHOCYTES NFR BLD AUTO: 19.3 %
MCH RBC QN AUTO: 28.9 PG (ref 26–34)
MCHC RBC AUTO-ENTMCNC: 34 G/DL (ref 31–37)
MCV RBC AUTO: 85 FL (ref 80–100)
MONOCYTES # BLD AUTO: 0.78 X10(3) UL (ref 0.1–1)
MONOCYTES NFR BLD AUTO: 11.1 %
NEUTROPHILS # BLD AUTO: 4.75 X10 (3) UL (ref 1.5–7.7)
NEUTROPHILS # BLD AUTO: 4.75 X10(3) UL (ref 1.5–7.7)
NEUTROPHILS NFR BLD AUTO: 67.8 %
OSMOLALITY SERPL CALC.SUM OF ELEC: 288 MOSM/KG (ref 275–295)
PLATELET # BLD AUTO: 549 10(3)UL (ref 150–450)
POTASSIUM SERPL-SCNC: 3.7 MMOL/L (ref 3.5–5.1)
PSA SERPL DL<=0.01 NG/ML-MCNC: 14 SECONDS (ref 12.5–14.7)
RBC # BLD AUTO: 5.26 X10(6)UL (ref 4.3–5.7)
SODIUM SERPL-SCNC: 140 MMOL/L (ref 136–145)
WBC # BLD AUTO: 7 X10(3) UL (ref 4–11)

## 2019-09-08 PROCEDURE — 99233 SBSQ HOSP IP/OBS HIGH 50: CPT | Performed by: OTHER

## 2019-09-08 PROCEDURE — 99233 SBSQ HOSP IP/OBS HIGH 50: CPT | Performed by: HOSPITALIST

## 2019-09-08 PROCEDURE — 99233 SBSQ HOSP IP/OBS HIGH 50: CPT | Performed by: SPECIALIST

## 2019-09-08 RX ORDER — WARFARIN SODIUM 7.5 MG/1
7.5 TABLET ORAL
Status: COMPLETED | OUTPATIENT
Start: 2019-09-08 | End: 2019-09-08

## 2019-09-08 NOTE — SLP NOTE
SPEECH DAILY NOTE - INPATIENT    ASSESSMENT & PLAN   ASSESSMENT  Pt seen for po trials to assess for improvement and possible initiation of least restrictive diet consistency or need for VFSS. D/w with Neuro and RN prior to entering room.  Pt with hx/o CVA and ADRS. Not targeted.      FOLLOW UP  Follow Up Needed: Yes  SLP Follow-up Date: 09/09/19  Number of Visits to Meet Established Goals: 8        If you have any questions, please contact Sanna Ibrahim

## 2019-09-08 NOTE — PROGRESS NOTES
[facility] Progress Note      Patient Name: Bladimir Cormier   YOB: 1985  Medical Record Number: [id number]    Date of Visit: 9/4/2019       Vira Lorenzo is a 35year old male with history of hypertensive Continuous PRN   phenylephrine in NaCl (HELADIO-SYNEPHRINE) 50 mg/250 ml premix infusion SOLN 100-200 mcg/min Intravenous Continuous PRN   atorvastatin (LIPITOR) tab 80 mg 80 mg Oral Nightly   aspirin 300 MG rectal suppository 300 mg 300 mg Rectal Daily   Or JVD.  Respiratory Normal effort; no respiratory distress. Cardiovascular Regular rate and rhythm. Integumentary Skin is warm and dry. Neurologic Alert; expressive aphasia. Psychiatric Appears frustrated when speaking.      Laboratory   Recent Results ( (3) uL    Neutrophil Absolute 4.75 1.50 - 7.70 x10(3) uL    Lymphocyte Absolute 1.35 1.00 - 4.00 x10(3) uL    Monocyte Absolute 0.78 0.10 - 1.00 x10(3) uL    Eosinophil Absolute 0.06 0.00 - 0.70 x10(3) uL    Basophil Absolute 0.04 0.00 - 0.20 x10(3) uL would not carry a thrombosis risk. Electronically signed by:    Aleks Moss M.D.   [facility]  Director, Bone and Soft Tissue Sarcoma Program  Section of Hematology/Oncology, [facility]

## 2019-09-08 NOTE — PROGRESS NOTES
[facility]  Progress Note     Maggy Collins Patient Status:  Inpatient    1985 MRN [id number]   [facility] 7NE-A Attending Mike Bardales MD   Hosp Day # 3 PCP None Pcp     Chief Complaint: aphasia    S: Patient is more awake in this interval not displayed. Estimated Creatinine Clearance: 101 mL/min (based on SCr of 1.19 mg/dL).     Recent Labs   Lab 09/06/19  0420 09/07/19  0452 09/08/19  0438   PTP 14.8* 14.1 14.0   INR 1.11* 1.05 1.04       Recent Labs   Lab 09/04/19  2

## 2019-09-08 NOTE — PLAN OF CARE
Assumed patient care 0700 per day shift  VSS during shift, oxygen maintained on RA  Neuros q4hrs   Patient alert, follows some commands, expressive aphasia present  Able to move all extremities, weakness present- improving throughout shift  Heparin drip in quality of pulses, skin color and temperature  - Assess for signs of decreased coronary artery perfusion - ex.  Angina  - Evaluate fluid balance, assess for edema, trend weights  Outcome: Progressing  Goal: Absence of cardiac arrhythmias or at baseline  Baltazar ordered  - Assess for signs and symptoms of hyperglycemia and hypoglycemia  - Administer ordered medications to maintain glucose within target range  - Assess barriers to adequate nutritional intake and initiate nutrition consult as needed  - Instruct donna cerebral perfusion and minimize risk of hemorrhage  - Monitor temperature, glucose, and sodium.  Initiate appropriate interventions as ordered  Outcome: Progressing  Goal: Absence of seizures  Description  INTERVENTIONS  - Monitor for seizure activity  - Ad Activities of Daily Living  Goal: Achieve highest/safest level of independence in self care  Description  Interventions:  - Assess ability and encourage patient to participate in ADLs to maximize function  - Promote sitting position while performing ADLs s

## 2019-09-08 NOTE — PROGRESS NOTES
Pharmacy Dosing Service: Warfarin (Coumadin)  Inga St is a 35year old male for whom pharmacy has been dosing warfarin (Coumadin).  Goal INR is 2-3    Recent Labs   Lab 09/04/19  2126 09/06/19  0420 09/07/19  0452 09/08/19  0438   INR 1.05 1.11* 1.0

## 2019-09-08 NOTE — PLAN OF CARE
Efficacy of swallow overall improved in comparison to what was reported yesterday. Continue NPO with po trials. Pt may benefit from VFSS. Will continue to monitor. RN aware.

## 2019-09-08 NOTE — PLAN OF CARE
NURSING TRANSFER NOTE    Patient admitted via BED. Oriented to room. Safety precautions initiated. Bed in low position. Call light in reach. Transferred pt from ccu to room 7626 accompanied by transport and an RN.  Pt alert and awake, w/ expressi

## 2019-09-08 NOTE — PROGRESS NOTES
[facility]  Progress Note    Oralia Sandoval Patient Status:  Inpatient    1985 MRN [id number]   [facility] 7NE-A Attending Andrey Hurley MD   Hosp Day # 3 PCP None Pcp       SUBJECTIVE:  No acute events overnight.   No signific Encounters:  09/07/19 : 178 lb 5.6 oz (80.9 kg)  09/05/19 : 175 lb 4.3 oz (79.5 kg)    Constitutional Vital signs in last 24 hours:/67 (BP Location: Left arm)   Pulse 69   Temp 98 °F (36.7 °C) (Oral)   Resp 19   Ht 74\"   Wt 178 lb 5.6 oz (80.9 kg) (DEX-4) chewable tab 4 tablet 4 tablet Oral Q15 Min PRN   Or      dextrose 50 % injection 50 mL 50 mL Intravenous Q15 Min PRN   Or      glucose (DEX4) oral liquid 30 g 30 g Oral Q15 Min PRN   Or      Glucose-Vitamin C (DEX-4) chewable tab 8 tablet 8 tablet -continue tube feeds  -accuchecks q6hrs.  -will continue to monitoring today.      Cerebral venous thrombosis / AMS - Neurology following. -ASA statin, heparin drip     Thrombocytosis - 506 9/6. Evaluation by Heme. Jak2 mutation analysis in process.

## 2019-09-09 LAB
APTT PPP: 64.4 SECONDS (ref 25.4–36.1)
APTT PPP: 82.9 SECONDS (ref 25.4–36.1)
BASOPHILS # BLD AUTO: 0.03 X10(3) UL (ref 0–0.2)
BASOPHILS NFR BLD AUTO: 0.5 %
DEPRECATED RDW RBC AUTO: 41.7 FL (ref 35.1–46.3)
EOSINOPHIL # BLD AUTO: 0.11 X10(3) UL (ref 0–0.7)
EOSINOPHIL NFR BLD AUTO: 1.8 %
ERYTHROCYTE [DISTWIDTH] IN BLOOD BY AUTOMATED COUNT: 13.4 % (ref 11–15)
GLUCOSE BLD-MCNC: 103 MG/DL (ref 70–99)
GLUCOSE BLD-MCNC: 83 MG/DL (ref 70–99)
GLUCOSE BLD-MCNC: 88 MG/DL (ref 70–99)
GLUCOSE BLD-MCNC: 90 MG/DL (ref 70–99)
GLUCOSE BLD-MCNC: 90 MG/DL (ref 70–99)
HCT VFR BLD AUTO: 40.1 % (ref 39–53)
HGB BLD-MCNC: 13.4 G/DL (ref 13–17.5)
IMM GRANULOCYTES # BLD AUTO: 0.01 X10(3) UL (ref 0–1)
IMM GRANULOCYTES NFR BLD: 0.2 %
INR BLD: 1.12 (ref 0.9–1.1)
LYMPHOCYTES # BLD AUTO: 1.51 X10(3) UL (ref 1–4)
LYMPHOCYTES NFR BLD AUTO: 24.2 %
MCH RBC QN AUTO: 28.4 PG (ref 26–34)
MCHC RBC AUTO-ENTMCNC: 33.4 G/DL (ref 31–37)
MCV RBC AUTO: 85 FL (ref 80–100)
MONOCYTES # BLD AUTO: 0.7 X10(3) UL (ref 0.1–1)
MONOCYTES NFR BLD AUTO: 11.2 %
NEUTROPHILS # BLD AUTO: 3.87 X10 (3) UL (ref 1.5–7.7)
NEUTROPHILS # BLD AUTO: 3.87 X10(3) UL (ref 1.5–7.7)
NEUTROPHILS NFR BLD AUTO: 62.1 %
PLATELET # BLD AUTO: 516 10(3)UL (ref 150–450)
PSA SERPL DL<=0.01 NG/ML-MCNC: 14.9 SECONDS (ref 12.5–14.7)
RBC # BLD AUTO: 4.72 X10(6)UL (ref 4.3–5.7)
WBC # BLD AUTO: 6.2 X10(3) UL (ref 4–11)

## 2019-09-09 PROCEDURE — 99231 SBSQ HOSP IP/OBS SF/LOW 25: CPT | Performed by: NURSE PRACTITIONER

## 2019-09-09 PROCEDURE — 99232 SBSQ HOSP IP/OBS MODERATE 35: CPT | Performed by: HOSPITALIST

## 2019-09-09 PROCEDURE — 99233 SBSQ HOSP IP/OBS HIGH 50: CPT | Performed by: OTHER

## 2019-09-09 RX ORDER — WARFARIN SODIUM 7.5 MG/1
7.5 TABLET ORAL
Status: COMPLETED | OUTPATIENT
Start: 2019-09-09 | End: 2019-09-09

## 2019-09-09 NOTE — PLAN OF CARE
Assumed care at 0730  Patient alert, expressive aphasia noted  Able to tell me his name, and birth month.   Neuro checks Q4H  Inconsistently following commands  NSR on tele  TF infusing at goal via Dobhoff  Advanced to regular diet per speech  Tolerated nish therapy as ordered  Outcome: Progressing     Problem: RESPIRATORY - ADULT  Goal: Achieves optimal ventilation and oxygenation  Description  INTERVENTIONS:  - Assess for changes in respiratory status  - Assess for changes in mentation and behavior  - Positi replacements, including rhythm and repeat lab results as appropriate  - Fluid restriction as ordered  - Instruct patient on fluid and nutrition restrictions as appropriate  Outcome: Progressing  Goal: Hemodynamic stability and optimal renal function mainta increase activity and self care with guidance from PT/OT  - Encourage visually impaired, hearing impaired and aphasic patients to use assistive/communication devices  - Allow ample time for patient to respond to questions d/t aphasia  - Encourage active RO performing ADLs such as feeding, grooming, and bathing  - Educate and encourage patient/family in tolerated functional activity level and precautions during self-care  - Encourage patient to incorporate impaired side during daily activities to promote func

## 2019-09-09 NOTE — PROGRESS NOTES
[id number] [facility] Progress Note    Alka Polk Patient Status:  Inpatient    1985 MRN [id number]   [facility] 7NE-A Attending Seun Red MD   [address] Day # 4 PCP None Pcp         Subjective:  Alka Polk is a(n) 33 the Ady and portions of the bilateral internal cerebral veins. John Lout is also a small amount nonocclusive thrombus noted within the left jugular bulb.     2.  There is patchy enhancement noted in the bilateral thalami and left basal ganglia which coul see above   Hypercoagulable w/u negative so far   JAK2 pending       Plan:    Cont  mg daily and heparin gtt until INR therapeutic 2-3  Cont coumadin ( started on 9/6) when INR 2-3 ok to stop heparin gtt   Atorvastatin 80 mg qhs  BP goal 120-160  DV

## 2019-09-09 NOTE — PROGRESS NOTES
[facility]  Progress Note     Maggy Collins Patient Status:  Inpatient    1985 MRN [id number]   [facility] 7NE-A Attending Mike Bardales MD   Hosp Day # 4 PCP None Pcp     Chief Complaint: aphasic    S: Patient feels much bett ALT 35  --   --   --   --    BILT 0.8  --   --   --   --    TP 7.6  --   --   --   --     < > = values in this interval not displayed. Estimated Creatinine Clearance: 101 mL/min (based on SCr of 1.19 mg/dL).     Recent Labs   Lab 09/07/19  8949 09/0

## 2019-09-09 NOTE — PLAN OF CARE
Daily Stroke Rounds:    Attendees:      Neuro APN/PA: Ana Laura Arshad RN: Isabel Quispe     SW/CM: Sunny Plant    Charge Nurse     Discussion:    Acute bilateral thalamic and left BG infarct, + multiple venous thrombus on MRV  On asa 325 mg and heparin gtt,

## 2019-09-09 NOTE — PROGRESS NOTES
[facility] Progress Note      Patient Name: Thomas De La Garza   YOB: 1985  Medical Record Number: [id number]  Date of Visit: 9/9/2019  Attending: Dr. Max Kee is a 35year old male with hi ml premix infusion 5-15 mg/hr Intravenous Continuous PRN   phenylephrine in NaCl (HELADIO-SYNEPHRINE) 50 mg/250 ml premix infusion SOLN 100-200 mcg/min Intravenous Continuous PRN   atorvastatin (LIPITOR) tab 80 mg 80 mg Oral Nightly   aspirin 300 MG rectal sup anicteric. ENMT Dobbhoff in place. Respiratory Normal effort; no respiratory distress. Cardiovascular Regular rate and rhythm. Integumentary Skin is warm and dry. Neurologic Alert; expressive aphasia.    Psychiatric Calm    Laboratory   Recent Results Value Ref Range    POC Glucose 103 (H) 70 - 99 mg/dL     Impression and Plan     1. CVA: Patient on aspirin and anticoagulation as per neurology.  Hypercoagulable workup including genotype for Factor V leiden and Factor II, homocysteine, anticardiolipin ant

## 2019-09-09 NOTE — SLP NOTE
ADULT SWALLOWING EVALUATION    ASSESSMENT    ASSESSMENT/OVERALL IMPRESSION:  Patient seen to reassess swallow function. He was admitted due to family noting he was \"out of it\".   He was found to have left basal ganglia, caudate and thalamic as well as ri evaluation  Discharge Recommendations/Plan: Undetermined    HISTORY   MEDICAL HISTORY  Reason for Referral: Stroke protocol    Problem List  Principal Problem:    Aphasia  Active Problems:    Ischemic stroke diagnosed during current admission ([facility] [address]    Cere possible esophageal involvement              GOALS  Goal #1 The patient will tolerate regular consistency and thin liquids without overt signs or symptoms of aspiration with 100 % accuracy over 2-3 session(s).   In Progress   Goal #2 The patient/family/care

## 2019-09-09 NOTE — PHYSICAL THERAPY NOTE
PHYSICAL THERAPY TREATMENT NOTE - INPATIENT    Room Number: 7539/0353-R     Session: 1   Number of Visits to Meet Established Goals: 10    Presenting Problem: B basal ganglia thrombus    Problem List  Principal Problem:    Aphasia  Active Problems:    Isc Degree of Impairment: 54.16%   Standardized Score (AM-PAC Scale): 40.78   CMS Modifier (G-Code): CK    FUNCTIONAL ABILITY STATUS  Gait Assessment   Gait Assistance: Minimum assistance  Distance (ft): 200  Assistive Device: Rolling walker  Pattern: Festinat children: Yes  Is it boy or girl: girl  What is her name: Lalit  How old is she: 6 months  Do you live with your brother: no  Do you have family here: no, just visiting  Do you play soccer: yes    Pt also able to make statements such as: \"chair thomasti address deficits. Discussed with SW/CM and family. Also discussed with pt. DISCHARGE RECOMMENDATIONS  PT Discharge Recommendations: Acute rehabilitation     PLAN  PT Treatment Plan: Bed mobility; Patient education; Family education;Gait training;Neuromus

## 2019-09-09 NOTE — OCCUPATIONAL THERAPY NOTE
OCCUPATIONAL THERAPY TREATMENT NOTE - INPATIENT     Room Number: 2423/1657-I  Session: 1   Number of Visits to Meet Established Goals: 10    Presenting Problem: Aphasia    History related to current admission: Pt was found by family lethargic and confused. ASSESSMENT  Rating: Unable to rate  Location: Used communicate board, but did not communicate pain        ACTIVITY TOLERANCE                         O2 SATURATIONS                ACTIVITIES OF DAILY LIVING ASSESSMENT  AM-PAC ‘6-Clicks’ Inpatient Daily Acti Educated the pt about use of R hand to complete full ranging of L fingers and wrist. Min A at this time. Issued foam block to encourage L hand movement. Used pillow and foam block to provide optimal positioning of L arm and hand. Chair alarm on.   Patient Goal  Patient will be maximum assistance with bilateral AAROM  (for R) HEP (home exercise program).      Additional Goals:  Pt will complete star cancellation test.   Pt will attend to items on the R side with 40% consistency during ADL completion - met 9/9

## 2019-09-09 NOTE — PLAN OF CARE
Assumed care @ 1900. Patient alert oriented x1  NG tube in place with Jevity 1.5 running. Expressive aphasia noted. On telemetry monitoring. Updated patient with plan of care, needs reinforcement  Ensures patient is safe at all times.   Maintained a henrietta Progressing     Problem: CARDIOVASCULAR - ADULT  Goal: Maintains optimal cardiac output and hemodynamic stability  Description  INTERVENTIONS:  - Monitor vital signs, rhythm, and trends  - Monitor for bleeding, hypotension and signs of decreased cardiac ou Monitor intake/output and perform bladder scan as needed  - Follow urinary retention protocol/standard of care  - Consider collaborating with pharmacy to review patient's medication profile  - Implement strategies to promote bladder emptying  Outcome: Prog activity/tolerance for mobility and gait  - Educate and engage patient/family in tolerated activity level and precautions  - Recommend use of total lift for transfers   Outcome: Progressing

## 2019-09-09 NOTE — CONSULTS
[address] [place] [address] Patient Status:  Inpatient    1985 MRN [id number]   [facility] 7NE-A Attending Zac Burns MD   Hosp Day # 4 PCP None Pcp     Patient Identification  Inga St is a 35year old male.   :   was no diagnostic evidence for regional wall motion abnormalities. Left ventricular diastolic function parameters were normal.  2. Left atrium: The left atrium was mildly dilated. 3. Atrial septum: Echo contrast study showed no shunt.   4. Pulmonary ar Q15 Min PRN   Or      glucose (DEX4) oral liquid 30 g 30 g Oral Q15 Min PRN   Or      Glucose-Vitamin C (DEX-4) chewable tab 8 tablet 8 tablet Oral Q15 Min PRN   [COMPLETED] Warfarin Sodium (COUMADIN) tab 5 mg 5 mg Oral Once at night   acetaminophen (TYLEN [COMPLETED] dextrose 50 % injection 50 mL 50 mL Intravenous Once   [COMPLETED] sodium chloride 0.9% IV bolus 1,000 mL 1,000 mL Intravenous Once   [COMPLETED] levETIRAcetam (KEPPRA) 1,000 mg in sodium chloride 0.9% 100 mL IVPB 1,000 mg Intravenous Once rigidity. Liver and spleen are not enlarged. Bowel sounds present. Musculoskeletal:     Right Upper Extremity:  Strength is 4-5. ROM WNL. Left Upper Extremity:  Strength is 3-4. ROM WNL. Right Lower Extremity:  Strength  is 5.    RO

## 2019-09-09 NOTE — PROGRESS NOTES
[facility] Dosing Service  Warfarin (Coumadin) Subsequent Dosing    Alfreda Dean is a 35year old male for whom pharmacy has been dosing warfarin (Coumadin).  Goal INR is 2-3    Recent Labs   Lab 09/04/19  2126 09/06/19  0420 09/07/19  0452 09/08/19  0

## 2019-09-09 NOTE — PROGRESS NOTES
[facility]  Progress Note    Oralia Sandoval Patient Status:  Inpatient    1985 MRN [id number]   [facility] 7NE-A Attending Andrey Hurley MD   Hosp Day # 4 PCP None Pcp       SUBJECTIVE:  No acute events overnight.   No signific Encounters:  09/07/19 : 178 lb 5.6 oz (80.9 kg)  09/05/19 : 175 lb 4.3 oz (79.5 kg)    Constitutional Vital signs in last 24 hours:/78 (BP Location: Left arm)   Pulse 71   Temp 98.7 °F (37.1 °C) (Oral)   Resp (!) 29   Ht 74\"   Wt 178 lb 5.6 oz (80.9 mg/200 ml premix infusion 5-15 mg/hr Intravenous Continuous PRN   phenylephrine in NaCl (HELADIO-SYNEPHRINE) 50 mg/250 ml premix infusion SOLN 100-200 mcg/min Intravenous Continuous PRN   atorvastatin (LIPITOR) tab 80 mg 80 mg Oral Nightly   aspirin 300 MG rec

## 2019-09-09 NOTE — CM/SW NOTE
Spoke with pt's brother-in-law Keyla Mi about pt not being able to qualify for acute rehab. Pt is not a citizen and will not qualify for any danielle care. He understood the circumstances. Pt has been staying with Keyla Mi and Beauty Binning wife Nadia.   Pt's wife N

## 2019-09-10 LAB
APTT PPP: 56.2 SECONDS (ref 25.4–36.1)
BASOPHILS # BLD AUTO: 0.03 X10(3) UL (ref 0–0.2)
BASOPHILS NFR BLD AUTO: 0.4 %
DEPRECATED RDW RBC AUTO: 41.8 FL (ref 35.1–46.3)
EOSINOPHIL # BLD AUTO: 0.13 X10(3) UL (ref 0–0.7)
EOSINOPHIL NFR BLD AUTO: 1.8 %
ERYTHROCYTE [DISTWIDTH] IN BLOOD BY AUTOMATED COUNT: 13.6 % (ref 11–15)
GLUCOSE BLD-MCNC: 123 MG/DL (ref 70–99)
GLUCOSE BLD-MCNC: 128 MG/DL (ref 70–99)
GLUCOSE BLD-MCNC: 83 MG/DL (ref 70–99)
GLUCOSE BLD-MCNC: 95 MG/DL (ref 70–99)
HCT VFR BLD AUTO: 38.4 % (ref 39–53)
HGB BLD-MCNC: 13 G/DL (ref 13–17.5)
IMM GRANULOCYTES # BLD AUTO: 0.03 X10(3) UL (ref 0–1)
IMM GRANULOCYTES NFR BLD: 0.4 %
INR BLD: 1.28 (ref 0.9–1.1)
LYMPHOCYTES # BLD AUTO: 1.78 X10(3) UL (ref 1–4)
LYMPHOCYTES NFR BLD AUTO: 24.4 %
MCH RBC QN AUTO: 28.3 PG (ref 26–34)
MCHC RBC AUTO-ENTMCNC: 33.9 G/DL (ref 31–37)
MCV RBC AUTO: 83.7 FL (ref 80–100)
METANEPHRINE: 0.15 NMOL/L
MONOCYTES # BLD AUTO: 0.73 X10(3) UL (ref 0.1–1)
MONOCYTES NFR BLD AUTO: 10 %
NEUTROPHILS # BLD AUTO: 4.6 X10 (3) UL (ref 1.5–7.7)
NEUTROPHILS # BLD AUTO: 4.6 X10(3) UL (ref 1.5–7.7)
NEUTROPHILS NFR BLD AUTO: 63 %
NORMETANEPHRINE: 0.76 NMOL/L
PLATELET # BLD AUTO: 552 10(3)UL (ref 150–450)
PSA SERPL DL<=0.01 NG/ML-MCNC: 16.6 SECONDS (ref 12.5–14.7)
RBC # BLD AUTO: 4.59 X10(6)UL (ref 4.3–5.7)
WBC # BLD AUTO: 7.3 X10(3) UL (ref 4–11)

## 2019-09-10 PROCEDURE — 99233 SBSQ HOSP IP/OBS HIGH 50: CPT | Performed by: OTHER

## 2019-09-10 PROCEDURE — 99232 SBSQ HOSP IP/OBS MODERATE 35: CPT | Performed by: HOSPITALIST

## 2019-09-10 PROCEDURE — 99232 SBSQ HOSP IP/OBS MODERATE 35: CPT | Performed by: NURSE PRACTITIONER

## 2019-09-10 RX ORDER — HYDROXYUREA 500 MG/1
1000 CAPSULE ORAL DAILY
Status: DISCONTINUED | OUTPATIENT
Start: 2019-09-10 | End: 2019-09-11

## 2019-09-10 RX ORDER — WARFARIN SODIUM 10 MG/1
10 TABLET ORAL
Status: COMPLETED | OUTPATIENT
Start: 2019-09-10 | End: 2019-09-10

## 2019-09-10 NOTE — CM/SW NOTE
Per CM/RADHA supervisor, pt was referred to [address] [place] [address] for acute rehab to find out if they would, in any way, accept pt for acute rehab. Waiting for a response.

## 2019-09-10 NOTE — SLP NOTE
SPEECH/LANGUAGE/COGNITIVE EVALUATION & DYSPHAGIA FOLLOW UP - INPATIENT    Admission Date: 9/4/2019  Evaluation Date: 09/10/19    Reason for Referral: Stroke protocol    ASSESSMENT & PLAN   ASSESSMENT & IMPRESSION  Patient seen for aphasia assessment and dy Identified: Verbal expression; Auditory comprehension;Written expression;Reading comprehension    Discharge Recommendations/Plan: Acute rehabilitation;Home with home health SLP    Patient Experiencing Pain: No  Pain Rating: ( )   Pain Location: ( )     Nurs Aphasia therapy  Number of Visits to Meet Established Goals: 5  Follow Up Needed: Yes  SLP Follow-up Date: 09/11/19    Thank you for your referral.  If you have any questions please contact Addi Cooper [phone]  Pager [phone]

## 2019-09-10 NOTE — PLAN OF CARE
Pt Alert, expressive aphasia, impulsive. Neuro Q4. NSR Pt tolerated food well. Pt no c/o pain. Heparing infusing per protocol. IVF infusing as ordered. Next PTT in am.  Will continue to monitor.

## 2019-09-10 NOTE — PLAN OF CARE
I attempted to reach family at both numbers provided on facesheet - no answer. Addendum: emailed spouse to get her full info and contact # for Maxime James.

## 2019-09-10 NOTE — OCCUPATIONAL THERAPY NOTE
OCCUPATIONAL THERAPY TREATMENT NOTE - INPATIENT     Room Number: 9080/7207-D  Session: 2   Number of Visits to Meet Established Goals: 10    Presenting Problem: aphasia, Left BG and bilateral thalamic strokes    History related to current admission: Pt was answer these questions. \"     Patient self-stated goal is none identified     OBJECTIVE  Precautions: (-180)    WEIGHT BEARING RESTRICTION  Weight Bearing Restriction: None                PAIN ASSESSMENT  Ratin  Location: N/A        ACTIVITY SIMEON 3/4 correctly. Pt unable to accurately identify right versus left UE when asked throughout this session. Pt completed a clock draw with total A for drawing a larger clock as pt's original [place] was very small.  Pt required total A for initiation of task to care tasks: dressing, bathing, toileting, meal preparation, laundry and cleaning. Recommend acute rehab as pt is highly motivated to return to his PLOF. As baseline, pt is independent with all functional mobility and ADL tasks.  Pt is currently below hi completion - met 9/9  Pt will follow 70% of one step commands during ADLs - met 9/9  Pt will stand x 2 minutes for ADLs with 1 break.

## 2019-09-10 NOTE — PROGRESS NOTES
[facility] Dosing Service  Warfarin (Coumadin) Subsequent Dosing    Tiarra Rose Marie is a 35year old male for whom pharmacy has been dosing warfarin (Coumadin).  Goal INR is 2-3    Recent Labs   Lab 09/06/19  0420 09/07/19  0452 09/08/19  0438 09/09/19  0

## 2019-09-10 NOTE — PROGRESS NOTES
[facility]  Progress Note     Katelyn Rees Patient Status:  Inpatient    1985 MRN [id number]   [facility] 7NE-A Attending Chel Carrizales MD   Hosp Day # 5 PCP None Pcp     Chief Complaint: aphasic    S: Patient up in chair, ge 4.3   < > 3.8 3.8 3.7      < > 109 103 104   CO2 26.0   < > 32.0 27.0 29.0   ALKPHO 69  --   --   --   --    AST 17  --   --   --   --    ALT 35  --   --   --   --    BILT 0.8  --   --   --   --    TP 7.6  --   --   --   --     < > = values in this i consulate and provide info if needed

## 2019-09-10 NOTE — PROGRESS NOTES
[id number] [facility] Progress Note    Merlin Shoulders Patient Status:  Inpatient    1985 MRN [id number]   [facility] 7NE-A Attending Sushil Flores MD   Hosp Day # 5 PCP None Pcp         Subjective:  Merlin Shoulders is a(n) 33 cerebral veins. Herbert Mins is also a small amount nonocclusive thrombus noted within the left jugular bulb.   2. There is patchy enhancement noted in the bilateral thalami and left basal ganglia which could be due to subacute infarction, with other etiologies n embassy today.   NG tube removed yesterday and pt able to eat    Thomas Butter, [facility] [place]  9/10/2019, 8:47 AM  Melly Lai  Neurology Attending Addendum:  I have seen patient independently, reviewed history, labs and imaging, and

## 2019-09-10 NOTE — CM/SW NOTE
Opal from [facility] said that they are not able to accept pt. Still waiting to hear back from Osvaldo burroughs.

## 2019-09-10 NOTE — PHYSICAL THERAPY NOTE
PHYSICAL THERAPY TREATMENT NOTE - INPATIENT    Room Number: 4791/2585-N     Session: 2  Number of Visits to Meet Established Goals: 10    Presenting Problem: B basal ganglia thrombus     History related to current admission: Pt was found by family Elo Joseph and from a bed to a chair (including a wheelchair)?: A Little   -   Need to walk in hospital room?: A Little   -   Climbing 3-5 steps with a railing?: Total       AM-PAC Score:  Raw Score: 16   Approx Degree of Impairment: 54.16%   Standardized Score (AM-P young and will benefit from reaching independence in functional mobility to be able to return to community. If pt unable to go to acute rehabilitation, highly recommend 24 care care by family  with aggressive HHPT or outpatient PT to address deficits.    DI

## 2019-09-10 NOTE — PLAN OF CARE
Assumed care at 0730  Patient alert  Able to state name and birth month  Expressive aphasia and L sided weakness noted  NSR on tele  Denies pain or discomfort  Tolerating regular diet and thin liquids  Taking pills whole with applesauce  Heparin gtt infusi oxygenation  Description  INTERVENTIONS:  - Assess for changes in respiratory status  - Assess for changes in mentation and behavior  - Position to facilitate oxygenation and minimize respiratory effort  - Oxygen supplementation based on oxygen saturation fluid and nutrition restrictions as appropriate  Outcome: Progressing  Goal: Hemodynamic stability and optimal renal function maintained  Description  INTERVENTIONS:  - Monitor labs and assess for signs and symptoms of volume excess or deficit  - Monitor i use assistive/communication devices  - Allow ample time for patient to respond to questions d/t aphasia  - Encourage active ROM exercises to all extremities d/t weakness    Outcome: Progressing     Problem: Impaired Functional Mobility  Goal: Achieve highe Educate and encourage patient/family in tolerated functional activity level and precautions during self-care  - Encourage patient to incorporate impaired side during daily activities to promote function   Outcome: Progressing     Problem: Safety Risk - Non

## 2019-09-10 NOTE — PROGRESS NOTES
[facility] Progress Note      Patient Name: Cayden Bee   YOB: 1985  Medical Record Number: [id number]  Date of Visit: 9/10/2019  Attending: Dr. Cornelia Bond is a 35year old male with h (TRANDATE) injection 10 mg 10 mg Intravenous Q10 Min PRN   niCARdipine HCl in NaCl (CARDENE) 20 mg/200 ml premix infusion 5-15 mg/hr Intravenous Continuous PRN   phenylephrine in NaCl (HELADIO-SYNEPHRINE) 50 mg/250 ml premix infusion SOLN 100-200 mcg/min Intra developed. Head Normocephalic and atraumatic. Eyes Conjunctiva clear; sclera anicteric. Respiratory Normal effort; no respiratory distress. Cardiovascular Regular rate and rhythm. Integumentary Skin is warm and dry.   Neurologic Alert; improved expres Immature Granulocyte % 0.4 %   POCT GLUCOSE    Collection Time: 09/10/19  8:15 AM   Result Value Ref Range    POC Glucose 95 70 - 99 mg/dL     Impression and Plan     1.  CVA: Patient on aspirin and anticoagulation (warfarin with heparin bridging) as per ne

## 2019-09-11 LAB
ANION GAP SERPL CALC-SCNC: 1 MMOL/L (ref 0–18)
APTT PPP: 72.8 SECONDS (ref 25.4–36.1)
BASOPHILS # BLD AUTO: 0.06 X10(3) UL (ref 0–0.2)
BASOPHILS NFR BLD AUTO: 0.7 %
BUN BLD-MCNC: 11 MG/DL (ref 7–18)
BUN/CREAT SERPL: 9.5 (ref 10–20)
CALCIUM BLD-MCNC: 9.1 MG/DL (ref 8.5–10.1)
CHLORIDE SERPL-SCNC: 107 MMOL/L (ref 98–112)
CO2 SERPL-SCNC: 32 MMOL/L (ref 21–32)
CREAT BLD-MCNC: 1.16 MG/DL (ref 0.7–1.3)
DEPRECATED RDW RBC AUTO: 42.5 FL (ref 35.1–46.3)
EOSINOPHIL # BLD AUTO: 0.14 X10(3) UL (ref 0–0.7)
EOSINOPHIL NFR BLD AUTO: 1.7 %
ERYTHROCYTE [DISTWIDTH] IN BLOOD BY AUTOMATED COUNT: 13.7 % (ref 11–15)
GLUCOSE BLD-MCNC: 101 MG/DL (ref 70–99)
GLUCOSE BLD-MCNC: 104 MG/DL (ref 70–99)
GLUCOSE BLD-MCNC: 124 MG/DL (ref 70–99)
GLUCOSE BLD-MCNC: 87 MG/DL (ref 70–99)
GLUCOSE BLD-MCNC: 93 MG/DL (ref 70–99)
HCT VFR BLD AUTO: 40.2 % (ref 39–53)
HGB BLD-MCNC: 13.4 G/DL (ref 13–17.5)
IMM GRANULOCYTES # BLD AUTO: 0.03 X10(3) UL (ref 0–1)
IMM GRANULOCYTES NFR BLD: 0.4 %
INR BLD: 1.46 (ref 0.9–1.1)
JAK2 GENE, V617F MUTATION, QUALITATIVE: POSITIVE
LYMPHOCYTES # BLD AUTO: 1.86 X10(3) UL (ref 1–4)
LYMPHOCYTES NFR BLD AUTO: 22 %
MCH RBC QN AUTO: 28.2 PG (ref 26–34)
MCHC RBC AUTO-ENTMCNC: 33.3 G/DL (ref 31–37)
MCV RBC AUTO: 84.6 FL (ref 80–100)
MONOCYTES # BLD AUTO: 0.66 X10(3) UL (ref 0.1–1)
MONOCYTES NFR BLD AUTO: 7.8 %
NEUTROPHILS # BLD AUTO: 5.7 X10 (3) UL (ref 1.5–7.7)
NEUTROPHILS # BLD AUTO: 5.7 X10(3) UL (ref 1.5–7.7)
NEUTROPHILS NFR BLD AUTO: 67.4 %
OSMOLALITY SERPL CALC.SUM OF ELEC: 290 MOSM/KG (ref 275–295)
PLATELET # BLD AUTO: 577 10(3)UL (ref 150–450)
POTASSIUM SERPL-SCNC: 4.1 MMOL/L (ref 3.5–5.1)
PSA SERPL DL<=0.01 NG/ML-MCNC: 18.5 SECONDS (ref 12.5–14.7)
RBC # BLD AUTO: 4.75 X10(6)UL (ref 4.3–5.7)
SODIUM SERPL-SCNC: 140 MMOL/L (ref 136–145)
WBC # BLD AUTO: 8.5 X10(3) UL (ref 4–11)

## 2019-09-11 PROCEDURE — 99233 SBSQ HOSP IP/OBS HIGH 50: CPT | Performed by: SPECIALIST

## 2019-09-11 PROCEDURE — 99233 SBSQ HOSP IP/OBS HIGH 50: CPT | Performed by: OTHER

## 2019-09-11 PROCEDURE — 99232 SBSQ HOSP IP/OBS MODERATE 35: CPT | Performed by: HOSPITALIST

## 2019-09-11 RX ORDER — HYDROXYUREA 500 MG/1
1000 CAPSULE ORAL 2 TIMES DAILY
Status: DISCONTINUED | OUTPATIENT
Start: 2019-09-11 | End: 2019-09-14

## 2019-09-11 RX ORDER — WARFARIN SODIUM 10 MG/1
10 TABLET ORAL
Status: COMPLETED | OUTPATIENT
Start: 2019-09-11 | End: 2019-09-11

## 2019-09-11 NOTE — PLAN OF CARE
Assumed care at 1930  A&O x 1 to self only. Expressive aphasia. L arm strength seems to be better than previously charted. Unable to assess drift bilaterally due to weakness. Up in chair most of night. When moved to bed had to be cued to move each foot.  He

## 2019-09-11 NOTE — PLAN OF CARE
Problem: Impaired Communication  Goal: Patient will achieve maximal communication potential  Description  Interventions:  - Encourage use of alternative/augmentative communication to express basic wants and needs (use of communication/letter board/or sma

## 2019-09-11 NOTE — PLAN OF CARE
Assumed care at 0700. Pt A/O to person and time. Disoriented to place and situation. Pt has expressive and receptive aphasia. Neuro checks q4, see flow sheets. SBP maintained within ordered parameters. Pt denies any pain.  Heparin gtt  Infusing at 10ml/h trend weights  Outcome: Progressing  Goal: Absence of cardiac arrhythmias or at baseline  Description  INTERVENTIONS:  - Continuous cardiac monitoring, monitor vital signs, obtain 12 lead EKG if indicated  - Evaluate effectiveness of antiarrhythmic and hea adequate nutritional intake and initiate nutrition consult as needed  - Instruct patient on self management of diabetes  Outcome: Progressing  Goal: Electrolytes maintained within normal limits  Description  INTERVENTIONS:  - Monitor labs and rhythm and as Absence of seizures  Description  INTERVENTIONS  - Monitor for seizure activity  - Administer anti-seizure medications as ordered  - Monitor neurological status  Outcome: Progressing  Goal: Achieves maximal functionality and self care  Description  Eufemia Collins smartphone/tablet/handwriting)  - Provide modeling for options to improve word retrieval in known context  - Allow additional time for processing after asking questions or providing instructions   Outcome: Progressing     Problem: Impaired Activities of Da

## 2019-09-11 NOTE — PROGRESS NOTES
[facility] Progress Note      Patient Name: Henrietta Lebron   YOB: 1985  Medical Record Number: [id number]      Sonia Melgar is a 35year old male with history of stroke in [place] in 2015 admitted with v morphINE sulfate (PF) 4 MG/ML injection 2 mg 2 mg Intravenous Q2H PRN   Labetalol HCl (TRANDATE) injection 10 mg 10 mg Intravenous Q10 Min PRN   atorvastatin (LIPITOR) tab 80 mg 80 mg Oral Nightly   aspirin 300 MG rectal suppository 300 mg 300 mg Rectal JVD.  Respiratory Normal effort; no respiratory distress. Cardiovascular Regular rate and rhythm. Integumentary Skin is warm and dry. Neurologic Alert; moving all 4 extremities. Psychiatric Mood and affect appropriate.      Laboratory   Recent Results Neutrophil % 67.4 %    Lymphocyte % 22.0 %    Monocyte % 7.8 %    Eosinophil % 1.7 %    Basophil % 0.7 %    Immature Granulocyte % 0.4 %   POCT GLUCOSE    Collection Time: 09/11/19  8:09 AM   Result Value Ref Range    POC Glucose 87 70 - 99 mg/dL   POCT Alberto Field M.D.   [facility]  Director, Bone and Soft Tissue Sarcoma Program  Section of Hematology/Oncology, [facility]

## 2019-09-11 NOTE — CM/SW NOTE
Call placed to Michele Dickson [phone]. Inquired about a self pay discount. Representative stated the pt must go through [employer] patient accounts department for any kind of self pay discount or the financial assistance program/application for any danielle.

## 2019-09-11 NOTE — PROGRESS NOTES
[id number] [facility] Progress Note    Carl Martelldmont Patient Status:  Inpatient    1985 MRN [id number]   [facility] 7NE-A Attending Batsheva Tee MD   [facility] Day # 6 PCP None Pcp         Subjective:  Lollykarla Shivani is a(n) 33 straight sinus, basal veins of the Ady and portions of the bilateral internal cerebral veins. Sharlee Byrne is also a small amount nonocclusive thrombus noted within the left jugular bulb.   2. There is patchy enhancement noted in the bilateral thalami and l danielle coverage somewhere  NG tube removed on 9/9 and pt has been able to eat  Not cleared to fly at this time, will be determined outpatient, indeterminate and dependent on clinical course    Gilles Argueta, [address] [place] [address] Liliana  9/11/2019,

## 2019-09-11 NOTE — PROGRESS NOTES
[facility]  Progress Note     Holli Lucy Patient Status:  Inpatient    1985 MRN [id number]   [facility] 7NE-A Attending Scot Gonsales MD   Hosp Day # 6 PCP None Pcp     Chief Complaint: aphasic    S: Patient up in chair, ta --    AST 17  --   --   --   --    ALT 35  --   --   --   --    BILT 0.8  --   --   --   --    TP 7.6  --   --   --   --     < > = values in this interval not displayed. Estimated Creatinine Clearance: 103.6 mL/min (based on SCr of 1.16 mg/dL).     R

## 2019-09-11 NOTE — PROGRESS NOTES
Daily Stroke Rounds:    Attendees:    Neuro APN/PA: Sam Arshad RN: Constanza Cazares    SW/CM: Nan oCrcoran      Discussion: Stroke work up complete, on heparin gtt until INR > 2 (pharmacy dosing warfarin), RADHA attempting to obtain danielle r

## 2019-09-11 NOTE — PHYSICAL THERAPY NOTE
PHYSICAL THERAPY TREATMENT NOTE - INPATIENT    Room Number: 0095/2576-P     Session: 3  Number of Visits to Meet Established Goals: 10    Presenting Problem: B basal ganglia thrombus     History related to current admission: Pt was found by family Юлия Vidal need...   -   Moving to and from a bed to a chair (including a wheelchair)?: A Little   -   Need to walk in hospital room?: A Little   -   Climbing 3-5 steps with a railing?: Total       AM-PAC Score:  Raw Score: 16   Approx Degree of Impairment: 54.16% mobility to be able to return to community. If pt unable to go to acute rehabilitation, highly recommend 24 care care by family  with aggressive HHPT or outpatient PT to address deficits.      DISCHARGE RECOMMENDATIONS  PT Discharge Recommendations: Acute r

## 2019-09-11 NOTE — SLP NOTE
SPEECH DAILY NOTE - INPATIENT    ASSESSMENT & PLAN   ASSESSMENT  Patient seen for aphasia therapy. He is alert and up in bedside chair having just finished working with OT.   SLP targeted word reading and patient selecting proper agent and object based on

## 2019-09-11 NOTE — DIETARY NOTE
NUTRITION ASSESSMENT    Pt is at moderate nutrition risk. Pt does not meet malnutrition criteria.     NUTRITION DIAGNOSIS/PROBLEM:    Inadequate oral intake related to inability to consume adequate energy as evidenced by CVA    EVALUATION OF GOALS FROM PREV lean body mass    MEDICATIONS:  Colace, Senna, Coumadin, IVF    LABS:  Noted    FOLLOW-UP DATE: 9/16/19    Tequila Whittaker RD,LDN  Pager #[phone] [facility] [phone]

## 2019-09-12 LAB
ALBUMIN SERPL-MCNC: 3.5 G/DL (ref 3.4–5)
ALBUMIN/GLOB SERPL: 1 {RATIO} (ref 1–2)
ALP LIVER SERPL-CCNC: 57 U/L (ref 45–117)
ALT SERPL-CCNC: 86 U/L (ref 16–61)
ANION GAP SERPL CALC-SCNC: 7 MMOL/L (ref 0–18)
ANTITHROMBIN, ENZYMATIC (ACTIVITY): 113 %
APTT PPP: 42.1 SECONDS (ref 25.4–36.1)
APTT PPP: 43.4 SECONDS (ref 25.4–36.1)
APTT PPP: 99.4 SECONDS (ref 25.4–36.1)
AST SERPL-CCNC: 38 U/L (ref 15–37)
BASOPHILS # BLD AUTO: 0.06 X10(3) UL (ref 0–0.2)
BASOPHILS NFR BLD AUTO: 0.9 %
BILIRUB SERPL-MCNC: 0.4 MG/DL (ref 0.1–2)
BUN BLD-MCNC: 14 MG/DL (ref 7–18)
BUN/CREAT SERPL: 12 (ref 10–20)
CALCIUM BLD-MCNC: 8.9 MG/DL (ref 8.5–10.1)
CHLORIDE SERPL-SCNC: 107 MMOL/L (ref 98–112)
CO2 SERPL-SCNC: 27 MMOL/L (ref 21–32)
CREAT BLD-MCNC: 1.17 MG/DL (ref 0.7–1.3)
DEPRECATED RDW RBC AUTO: 43.3 FL (ref 35.1–46.3)
EOSINOPHIL # BLD AUTO: 0.17 X10(3) UL (ref 0–0.7)
EOSINOPHIL NFR BLD AUTO: 2.5 %
ERYTHROCYTE [DISTWIDTH] IN BLOOD BY AUTOMATED COUNT: 14 % (ref 11–15)
GLOBULIN PLAS-MCNC: 3.4 G/DL (ref 2.8–4.4)
GLUCOSE BLD-MCNC: 104 MG/DL (ref 70–99)
GLUCOSE BLD-MCNC: 117 MG/DL (ref 70–99)
GLUCOSE BLD-MCNC: 88 MG/DL (ref 70–99)
GLUCOSE BLD-MCNC: 92 MG/DL (ref 70–99)
HCT VFR BLD AUTO: 41.3 % (ref 39–53)
HGB BLD-MCNC: 13.8 G/DL (ref 13–17.5)
IMM GRANULOCYTES # BLD AUTO: 0.02 X10(3) UL (ref 0–1)
IMM GRANULOCYTES NFR BLD: 0.3 %
INR BLD: 1.89 (ref 0.9–1.1)
LYMPHOCYTES # BLD AUTO: 1.7 X10(3) UL (ref 1–4)
LYMPHOCYTES NFR BLD AUTO: 24.8 %
M PROTEIN MFR SERPL ELPH: 6.9 G/DL (ref 6.4–8.2)
MCH RBC QN AUTO: 28.6 PG (ref 26–34)
MCHC RBC AUTO-ENTMCNC: 33.4 G/DL (ref 31–37)
MCV RBC AUTO: 85.5 FL (ref 80–100)
MONOCYTES # BLD AUTO: 0.65 X10(3) UL (ref 0.1–1)
MONOCYTES NFR BLD AUTO: 9.5 %
NEUTROPHILS # BLD AUTO: 4.25 X10 (3) UL (ref 1.5–7.7)
NEUTROPHILS # BLD AUTO: 4.25 X10(3) UL (ref 1.5–7.7)
NEUTROPHILS NFR BLD AUTO: 62 %
OSMOLALITY SERPL CALC.SUM OF ELEC: 292 MOSM/KG (ref 275–295)
PLATELET # BLD AUTO: 611 10(3)UL (ref 150–450)
POTASSIUM SERPL-SCNC: 4 MMOL/L (ref 3.5–5.1)
PROTEIN C FUNCTIONAL: 112 %
PROTEIN S FUNCTIONAL: 80 %
PSA SERPL DL<=0.01 NG/ML-MCNC: 22.8 SECONDS (ref 12.5–14.7)
RBC # BLD AUTO: 4.83 X10(6)UL (ref 4.3–5.7)
SODIUM SERPL-SCNC: 141 MMOL/L (ref 136–145)
WBC # BLD AUTO: 6.9 X10(3) UL (ref 4–11)

## 2019-09-12 PROCEDURE — 99232 SBSQ HOSP IP/OBS MODERATE 35: CPT | Performed by: SPECIALIST

## 2019-09-12 PROCEDURE — 99233 SBSQ HOSP IP/OBS HIGH 50: CPT | Performed by: OTHER

## 2019-09-12 PROCEDURE — 99232 SBSQ HOSP IP/OBS MODERATE 35: CPT | Performed by: HOSPITALIST

## 2019-09-12 RX ORDER — ONDANSETRON 2 MG/ML
4 INJECTION INTRAMUSCULAR; INTRAVENOUS EVERY 6 HOURS PRN
Status: DISCONTINUED | OUTPATIENT
Start: 2019-09-12 | End: 2019-09-14

## 2019-09-12 RX ORDER — METOCLOPRAMIDE HYDROCHLORIDE 5 MG/ML
10 INJECTION INTRAMUSCULAR; INTRAVENOUS EVERY 8 HOURS PRN
Status: DISCONTINUED | OUTPATIENT
Start: 2019-09-12 | End: 2019-09-14

## 2019-09-12 NOTE — SLP NOTE
SPEECH DAILY NOTE - INPATIENT    ASSESSMENT & PLAN   ASSESSMENT  Pt seen this PM for aphasia treatment session. Pt cooperative, pleasant, and alert. No family or caregivers present at bedside.  Given findings from previous session- informal apraxia of speec 5    Session: 2/5    If you have any questions, please contact Anton Estrella M.S. Olegario Milo  Pager [phone]

## 2019-09-12 NOTE — PROGRESS NOTES
[facility] Dosing [facility]  Warfarin (Coumadin) Subsequent Dosing    Millwood Win is a 35year old male for whom pharmacy has been dosing warfarin (Coumadin).  Goal INR is 2-3    Recent Labs   Lab 09/08/19  0438 09/09/19  0546 09/10/19  0538 09/11/19  0

## 2019-09-12 NOTE — CONSULTS
[facility] Pharmacy Note:  Renal Dose Adjustment for Metoclopramide (REGLAN)    Rich Veliz has been on Metoclopramide (REGLAN) 5 mg every 8 hours as needed for N/V. Estimated Creatinine Clearance: 83.8 mL/min (based on SCr of 1.17 mg/dL).     His calculate

## 2019-09-12 NOTE — OCCUPATIONAL THERAPY NOTE
OCCUPATIONAL THERAPY TREATMENT NOTE - INPATIENT     Room Number: 1939/5460-X  Session: 3   Number of Visits to Meet Established Goals: 10    Presenting Problem: aphasia, Left BG and bilateral thalamic strokes    History related to current admission: Pt was alarm; Other (Comment)(-180 )    WEIGHT BEARING RESTRICTION  Weight Bearing Restriction: None                PAIN ASSESSMENT  Ratin  Location: N/A        ACTIVITY TOLERANCE                         O2 SATURATIONS                ACTIVITIES OF DAILY incorporate L UE into task. Pt required supervision to doff socks and max A to don new socks. Pt was left with call light, telephone and personal items within reach.  All questions were answered and pt's RN was notified of pt's present position and conditio rehabilitation setting. OT Discharge Recommendations: Acute rehabilitation  OT Device Recommendations: Magdalena bran    PLAN  OT Treatment Plan: Balance activities; ADL training;Visual perceptual training;Functional transfer training;UE strengthening/ROM;

## 2019-09-12 NOTE — PROGRESS NOTES
[facility]  Progress Note     Oralia Sandoval Patient Status:  Inpatient    1985 MRN [id number]   [facility] 7NE-A Attending Andrey Hurley MD   Hosp Day # 7 PCP None Pcp     Chief Complaint: aphasic    S: Patient without complai 09/12/19  0546   PTP 16.6* 18.5* 22.8*   INR 1.28* 1.46* 1.89*       No results for input(s): TROP, CK in the last 168 hours. Imaging: Imaging data reviewed in Epic.     Medications:   • hydroxyurea  1,000 mg Oral BID   • atorvastatin  80 mg Oral Ni

## 2019-09-12 NOTE — PROGRESS NOTES
[facility] Progress Note      Patient Name: Chacha Oconnor   YOB: 1985  Medical Record Number: [id number]  Date of Visit: 9/10/2019  Attending: Dr. Dash Ferraro  Patient sitting up in bed.  Continues to have Rectal Daily   Or      aspirin tab 325 mg 325 mg Oral Daily   Senna (SENOKOT) tab 17.2 mg 17.2 mg Oral Nightly   docusate sodium (COLACE) cap 100 mg 100 mg Oral BID   PEG 3350 (MIRALAX) powder packet 17 g 17 g Oral Daily PRN   magnesium hydroxide (MILK OF hour(s))   POCT GLUCOSE    Collection Time: 09/11/19 12:38 PM   Result Value Ref Range    POC Glucose 124 (H) 70 - 99 mg/dL   POCT GLUCOSE    Collection Time: 09/11/19  4:43 PM   Result Value Ref Range    POC Glucose 93 70 - 99 mg/dL   POCT GLUCOSE    Lila Monocyte Absolute 0.65 0.10 - 1.00 x10(3) uL    Eosinophil Absolute 0.17 0.00 - 0.70 x10(3) uL    Basophil Absolute 0.06 0.00 - 0.20 x10(3) uL    Immature Granulocyte Absolute 0.02 0.00 - 1.00 x10(3) uL    Neutrophil % 62.0 %    Lymphocyte % 24.8 %    [facility]

## 2019-09-12 NOTE — PROGRESS NOTES
[id number] [facility] Progress Note    Artemioliz CandelarioRees Patient Status:  Inpatient    1985 MRN [id number]   [facility] 7NE-A Attending Chel Carrizales MD   [facility] Day # 7 PCP None Pcp         Subjective:  Fortinorosalia Rees is a(n) 33 veins. Mary Chew is also a small amount nonocclusive thrombus noted within the left jugular bulb.   2. There is patchy enhancement noted in the bilateral thalami and left basal ganglia which could be due to subacute infarction, with other etiologies not entire this time, will be determined outpatient, indeterminate and dependent on clinical course    Page Lowers, [address]  9/12/2019, 8:43 AM  Melly Lai  Neurology Attending Addendum:  I have seen patient independently, reviewed hi

## 2019-09-12 NOTE — PHYSICAL THERAPY NOTE
PHYSICAL THERAPY TREATMENT NOTE - INPATIENT    Room Number: 0646/7273-K     Session: 4  Number of Visits to Meet Established Goals: 10    Presenting Problem: B basal ganglia thrombus     History related to current admission: Pt was found by family Priscila Yu from a bed to a chair (including a wheelchair)?: A Little   -   Need to walk in hospital room?: A Little   -   Climbing 3-5 steps with a railing?: Total       AM-PAC Score:  Raw Score: 17   Approx Degree of Impairment: 50.57%   Standardized Score (AM-PAC S pt is young and will benefit from reaching independence in functional mobility to be able to return to community.  If pt unable to go to acute rehabilitation, highly recommend 24 care care by family  with aggressive HHPT or outpatient PT to address deficits

## 2019-09-12 NOTE — PROGRESS NOTES
[facility] Progress Note      Patient Name: Rambo Cade   YOB: 1985  Medical Record Number: [id number]      Chelsea Wylie is a 35year old male with history of stroke in [place] in 2015 admitted with v suppository 650 mg 650 mg Rectal Q4H PRN   morphINE sulfate (PF) 4 MG/ML injection 1 mg 1 mg Intravenous Q2H PRN   Or      morphINE sulfate (PF) 4 MG/ML injection 2 mg 2 mg Intravenous Q2H PRN   Labetalol HCl (TRANDATE) injection 10 mg 10 mg Intravenous Q1 respiratory distress. Cardiovascular Regular rate and rhythm. Integumentary Skin is warm and dry. Neurologic Alert; moving all 4 extremities. Psychiatric Mood and affect appropriate.      Laboratory   Recent Results (from the past 24 hour(s))   POCT GL 7.70 x10 (3) uL    Neutrophil Absolute 4.25 1.50 - 7.70 x10(3) uL    Lymphocyte Absolute 1.70 1.00 - 4.00 x10(3) uL    Monocyte Absolute 0.65 0.10 - 1.00 x10(3) uL    Eosinophil Absolute 0.17 0.00 - 0.70 x10(3) uL    Basophil Absolute 0.06 0.00 - 0.20 x10( patient in the cancer center for his ET until he returns to [facility]. However, [employer] care will be required so that his blood counts can be monitored. Hydroxyurea is relatively inexpensive with 60 tablets 500 mg costing approximately $25-30.        E

## 2019-09-12 NOTE — PLAN OF CARE
A/O to self, RA, VSS, SR per Tele  Denies pain at this time  No new neuro deficits noted  Needs attended to, Will cont to monitor.

## 2019-09-12 NOTE — PLAN OF CARE
Assumed care @ 0700. Pt a/o x1-2, VSS. Neuro checks q4, no neuro status changes. Expressive aphasia noted. NIH 4. Tele SR. No acute respiratory distress noted. Denies any pain. Pt ambulated to the chair and bedside commode. (+) BM.   Brief gudino signs of decreased coronary artery perfusion - ex.  Angina  - Evaluate fluid balance, assess for edema, trend weights  Outcome: Progressing  Goal: Absence of cardiac arrhythmias or at baseline  Description  INTERVENTIONS:  - Continuous cardiac monitoring, m hypoglycemia  - Administer ordered medications to maintain glucose within target range  - Assess barriers to adequate nutritional intake and initiate nutrition consult as needed  - Instruct patient on self management of diabetes  Outcome: Progressing  Goal temperature, glucose, and sodium.  Initiate appropriate interventions as ordered  Outcome: Progressing  Goal: Absence of seizures  Description  INTERVENTIONS  - Monitor for seizure activity  - Administer anti-seizure medications as ordered  - Monitor neurol alternative/augmentative communication to express basic wants and needs (use of communication/letter board/or smartphone/tablet/handwriting)  - Provide modeling for options to improve word retrieval in known context  - Allow additional time for processing

## 2019-09-13 LAB
APTT PPP: 183.3 SECONDS (ref 25.4–36.1)
APTT PPP: 59.9 SECONDS (ref 25.4–36.1)
APTT PPP: 97.5 SECONDS (ref 25.4–36.1)
GLUCOSE BLD-MCNC: 78 MG/DL (ref 70–99)
GLUCOSE BLD-MCNC: 89 MG/DL (ref 70–99)
GLUCOSE BLD-MCNC: 90 MG/DL (ref 70–99)
GLUCOSE BLD-MCNC: 97 MG/DL (ref 70–99)
INR BLD: 2.16 (ref 0.9–1.1)
PSA SERPL DL<=0.01 NG/ML-MCNC: 25.4 SECONDS (ref 12.5–14.7)

## 2019-09-13 PROCEDURE — 99231 SBSQ HOSP IP/OBS SF/LOW 25: CPT | Performed by: NURSE PRACTITIONER

## 2019-09-13 PROCEDURE — 99232 SBSQ HOSP IP/OBS MODERATE 35: CPT | Performed by: HOSPITALIST

## 2019-09-13 RX ORDER — WARFARIN SODIUM 7.5 MG/1
7.5 TABLET ORAL
Status: COMPLETED | OUTPATIENT
Start: 2019-09-13 | End: 2019-09-13

## 2019-09-13 NOTE — PLAN OF CARE
Assumed care @ 1900. Patient alert oriented x1  Neurochecks q4  On telemetry monitoring. Denies SOB, Denies any pain. Updated patient with plan of care, needs reinforcement  Ensures patient is safe at all times. Maintained a calm and safe environment. need for suctioning and perform as needed  - Assess and instruct to report SOB or any respiratory difficulty  - Respiratory Therapy support as indicated  - Manage/alleviate anxiety  - Monitor for signs/symptoms of CO2 retention  Outcome: Progressing     Pr for patient's volume status, including labs, urine output, blood pressure (other measures as available)  - Encourage oral intake as appropriate  - Instruct patient on fluid and nutrition restrictions as appropriate  Outcome: Progressing     Problem: NEUROL

## 2019-09-13 NOTE — PROGRESS NOTES
[facility] Progress Note      Patient Name: Ce Castillo   YOB: 1985  Medical Record Number: [id number]  Date of Visit: 9/10/2019  Attending: Dr. Yuridia Malloy  Patient sitting in chair with speech therapy sulfate (PF) 4 MG/ML injection 2 mg 2 mg Intravenous Q2H PRN   Labetalol HCl (TRANDATE) injection 10 mg 10 mg Intravenous Q10 Min PRN   atorvastatin (LIPITOR) tab 80 mg 80 mg Oral Nightly   aspirin 300 MG rectal suppository 300 mg 300 mg Rectal Daily   Or left hand grasp improved   Psychiatric Calm    Laboratory   Recent Results (from the past 24 hour(s))   PTT, ACTIVATED    Collection Time: 09/12/19  8:52 PM   Result Value Ref Range    PTT 99.4 (H) 25.4 - 36.1 seconds   POCT GLUCOSE    Collection Time: 09/ [facility], does not have insurance. Since he is not a [place] Citizen, rehab services will likely be out of pocket. SW aware. If patient discharged over the weekend to home vs rehab will follow up on Monday for CBC tests and follow up with Dr. Asim Cruz.      Janelle

## 2019-09-13 NOTE — PROGRESS NOTES
[facility]  Progress Note     Chintan Mcgrath Patient Status:  Inpatient    1985 MRN [id number]   [facility] 7NE-A Attending Kamaljit Dominguez MD   Hosp Day # 8 PCP None Pcp     Chief Complaint: aphasic    S: Patient smiling in bed, results for input(s): TROP, CK in the last 168 hours. Imaging: Imaging data reviewed in Epic.     Medications:   • hydroxyurea  1,000 mg Oral BID   • atorvastatin  80 mg Oral Nightly   • aspirin  300 mg Rectal Daily    Or   • aspirin  325 mg Oral Da

## 2019-09-13 NOTE — SLP NOTE
SPEECH DAILY NOTE - INPATIENT    ASSESSMENT & PLAN   ASSESSMENT  Patient seen for aphasia therapy focused on improving recognition of written words and improving verbal output. He participated quite well and remains highly motivated.   Patient achieved 33%

## 2019-09-13 NOTE — PHYSICAL THERAPY NOTE
PHYSICAL THERAPY TREATMENT NOTE - INPATIENT    Room Number: 5536/6552-N     Session: 4  Number of Visits to Meet Established Goals: 10    Presenting Problem: B basal ganglia thrombus     History related to current admission: Pt was found by family Ameena Flores currently need. ..   -   Moving to and from a bed to a chair (including a wheelchair)?: A Little   -   Need to walk in hospital room?: A Little   -   Climbing 3-5 steps with a railing?: Total       AM-PAC Score:  Raw Score: 17   Approx Degree of Impairment: DC recommendations remains acute rehabilitation as pt is young and will benefit from reaching independence in functional mobility to be able to return to community.  If pt unable to go to acute rehabilitation, highly recommend 24 care care by family  wi

## 2019-09-14 VITALS
HEART RATE: 89 BPM | WEIGHT: 179.63 LBS | SYSTOLIC BLOOD PRESSURE: 137 MMHG | HEIGHT: 74 IN | BODY MASS INDEX: 23.05 KG/M2 | TEMPERATURE: 98 F | RESPIRATION RATE: 18 BRPM | OXYGEN SATURATION: 99 % | DIASTOLIC BLOOD PRESSURE: 76 MMHG

## 2019-09-14 LAB
ANION GAP SERPL CALC-SCNC: 5 MMOL/L (ref 0–18)
APTT PPP: 50.1 SECONDS (ref 25.4–36.1)
BASOPHILS # BLD AUTO: 0.05 X10(3) UL (ref 0–0.2)
BASOPHILS NFR BLD AUTO: 0.7 %
BCR-ABL1, T(9;22) QUAL, RT-PCR: NOT DETECTED
BUN BLD-MCNC: 12 MG/DL (ref 7–18)
BUN/CREAT SERPL: 9.8 (ref 10–20)
CALCIUM BLD-MCNC: 9.1 MG/DL (ref 8.5–10.1)
CHLORIDE SERPL-SCNC: 105 MMOL/L (ref 98–112)
CO2 SERPL-SCNC: 29 MMOL/L (ref 21–32)
CREAT BLD-MCNC: 1.22 MG/DL (ref 0.7–1.3)
DEPRECATED RDW RBC AUTO: 43.6 FL (ref 35.1–46.3)
EOSINOPHIL # BLD AUTO: 0.1 X10(3) UL (ref 0–0.7)
EOSINOPHIL NFR BLD AUTO: 1.3 %
ERYTHROCYTE [DISTWIDTH] IN BLOOD BY AUTOMATED COUNT: 14.2 % (ref 11–15)
GLUCOSE BLD-MCNC: 76 MG/DL (ref 70–99)
GLUCOSE BLD-MCNC: 79 MG/DL (ref 70–99)
GLUCOSE BLD-MCNC: 91 MG/DL (ref 70–99)
HCT VFR BLD AUTO: 43.2 % (ref 39–53)
HGB BLD-MCNC: 14.3 G/DL (ref 13–17.5)
IMM GRANULOCYTES # BLD AUTO: 0.02 X10(3) UL (ref 0–1)
IMM GRANULOCYTES NFR BLD: 0.3 %
INR BLD: 2.21 (ref 0.9–1.1)
LYMPHOCYTES # BLD AUTO: 1.61 X10(3) UL (ref 1–4)
LYMPHOCYTES NFR BLD AUTO: 21.7 %
MCH RBC QN AUTO: 28.4 PG (ref 26–34)
MCHC RBC AUTO-ENTMCNC: 33.1 G/DL (ref 31–37)
MCV RBC AUTO: 85.7 FL (ref 80–100)
MONOCYTES # BLD AUTO: 0.68 X10(3) UL (ref 0.1–1)
MONOCYTES NFR BLD AUTO: 9.2 %
NEUTROPHILS # BLD AUTO: 4.97 X10 (3) UL (ref 1.5–7.7)
NEUTROPHILS # BLD AUTO: 4.97 X10(3) UL (ref 1.5–7.7)
NEUTROPHILS NFR BLD AUTO: 66.8 %
OSMOLALITY SERPL CALC.SUM OF ELEC: 287 MOSM/KG (ref 275–295)
PLATELET # BLD AUTO: 694 10(3)UL (ref 150–450)
POTASSIUM SERPL-SCNC: 4.1 MMOL/L (ref 3.5–5.1)
PSA SERPL DL<=0.01 NG/ML-MCNC: 25.9 SECONDS (ref 12.5–14.7)
RBC # BLD AUTO: 5.04 X10(6)UL (ref 4.3–5.7)
SODIUM SERPL-SCNC: 139 MMOL/L (ref 136–145)
WBC # BLD AUTO: 7.4 X10(3) UL (ref 4–11)

## 2019-09-14 PROCEDURE — 99238 HOSP IP/OBS DSCHRG MGMT 30/<: CPT | Performed by: HOSPITALIST

## 2019-09-14 PROCEDURE — 99232 SBSQ HOSP IP/OBS MODERATE 35: CPT | Performed by: INTERNAL MEDICINE

## 2019-09-14 RX ORDER — WARFARIN SODIUM 7.5 MG/1
7.5 TABLET ORAL NIGHTLY
Status: DISCONTINUED | OUTPATIENT
Start: 2019-09-14 | End: 2019-09-14

## 2019-09-14 RX ORDER — WARFARIN SODIUM 5 MG/1
7.5 TABLET ORAL NIGHTLY
Qty: 90 TABLET | Refills: 2 | Status: SHIPPED | OUTPATIENT
Start: 2019-09-14

## 2019-09-14 RX ORDER — HYDROXYUREA 500 MG/1
1000 CAPSULE ORAL 2 TIMES DAILY
Qty: 120 CAPSULE | Refills: 0 | Status: SHIPPED | OUTPATIENT
Start: 2019-09-14

## 2019-09-14 RX ORDER — ASPIRIN 325 MG
325 TABLET ORAL DAILY
Qty: 30 TABLET | Refills: 1 | Status: SHIPPED | OUTPATIENT
Start: 2019-09-15

## 2019-09-14 RX ORDER — ATORVASTATIN CALCIUM 80 MG/1
80 TABLET, FILM COATED ORAL NIGHTLY
Qty: 30 TABLET | Refills: 1 | Status: SHIPPED | OUTPATIENT
Start: 2019-09-14

## 2019-09-14 NOTE — PROGRESS NOTES
[facility]  Progress Note     Holli Ayala Patient Status:  Inpatient    1985 MRN [id number]   [facility] 7NE-A Attending Scot Gonsales MD   Hosp Day # 9 PCP None Pcp     Chief Complaint: aphasic    S: Patient smiling in bed, No results for input(s): TROP, CK in the last 168 hours. Imaging: Imaging data reviewed in Epic.     Medications:   • hydroxyurea  1,000 mg Oral BID   • atorvastatin  80 mg Oral Nightly   • aspirin  300 mg Rectal Daily    Or   • aspirin  325 m yesterday about the need for f/u

## 2019-09-14 NOTE — DISCHARGE SUMMARY
[facility]  DISCHARGE SUMMARY     [address] Patient Status:  Inpatient    1985 MRN [id number]   [facility] 7NE-A Attending Charlene Henderson MD   [facility] Day # 9 PCP None Pcp     Date of Admission: 2019  Date of Discharge:  verbally. No arrhythmias during the stay and blood pressure has been stable. Cardiology did evaluate. Echocardiogram showing EF of 60%, minimally increased pulmonary pressures, no intracardiac thrombus or valvular pathology.   Hematology evaluated due to 9/15/2019      Take 1 tablet (325 mg total) by mouth daily. Quantity:  30 tablet  Refills:  1     atorvastatin 80 MG Tabs  Commonly known as:  LIPITOR      Take 1 tablet (80 mg total) by mouth nightly.    Quantity:  30 tablet  Refills:  1     hydroxyurea

## 2019-09-14 NOTE — SLP NOTE
SPEECH DAILY NOTE - INPATIENT    ASSESSMENT & PLAN   ASSESSMENT  Pt seen this PM for aphasia therapy session. Pt cooperative, pleasant, and alert. No family or caregivers present at bedside.  Pt completed matching picture object to picture object with 35% a

## 2019-09-14 NOTE — PLAN OF CARE
Received patient A/Chayito, ALEXX HAYWARDR on tele at 0700  Neuro checks q4 hours with baseline left sided weakness  Patient has expressive aphasia but understands and follows commands  Can be impulsive and gets up frequently, is unsteady, has bed and chair alarms ac edema, trend weights  Outcome: Progressing  Goal: Absence of cardiac arrhythmias or at baseline  Description  INTERVENTIONS:  - Continuous cardiac monitoring, monitor vital signs, obtain 12 lead EKG if indicated  - Evaluate effectiveness of antiarrhythmic barriers to adequate nutritional intake and initiate nutrition consult as needed  - Instruct patient on self management of diabetes  Outcome: Progressing  Goal: Electrolytes maintained within normal limits  Description  INTERVENTIONS:  - Monitor labs and r ordered   Outcome: Progressing  Goal: Absence of seizures  Description  INTERVENTIONS  - Monitor for seizure activity  - Administer anti-seizure medications as ordered  - Monitor neurological status  Outcome: Progressing  Goal: Achieves maximal functionali communication/letter board/or smartphone/tablet/handwriting)  - Provide modeling for options to improve word retrieval in known context  - Allow additional time for processing after asking questions or providing instructions   Outcome: Progressing     Prob

## 2019-09-14 NOTE — PLAN OF CARE
Reviewed with hematologist.  Pt will need labs - INR and CBC on Wednesday - he recommended at [facility] visit. Send on coumadin 7.5mg daily and hydroxyurea 1,000mg BID. Will need to follow up with MDs to determine when he can fly.    Future Appointments   Da

## 2019-09-14 NOTE — PROGRESS NOTES
Hematology/Oncology Progress Note    Patient Name: Holli Ayala  Medical Record Number: [id number]    YOB: 1985     Reason for Consultation:  Holli Ayala was seen today for the diagnosis of essential thrombocytosis    Interval events: 14.0 14.2   NEPRELIM 5.70 4.25 4.97       Recent Labs   Lab 09/11/19  0425 09/12/19  0546 09/14/19  0639    141 139   K 4.1 4.0 4.1    107 105   CO2 32.0 27.0 29.0   BUN 11 14 12   CREATSERUM 1.16 1.17 1.22   GFRAA 95 94 89   GFRNAA 82 81 77 [facility]

## 2019-09-14 NOTE — PLAN OF CARE
Discharge instructions, prescriptions, medications & follow-up appointments reviewed with pt and family. Verbalizes understanding. Walker given at discharge. Explained that someone needs to be with pt at all time. 24 hour caregiver list given.   Family

## 2019-09-14 NOTE — CM/SW NOTE
Regan Cervantes requesting that sw provide family with caregiver list.  SW tubed list to RN . Nina Doshi, [address] [facility] /Dischage Planner  [phone]  Pager [phone]

## 2019-09-14 NOTE — PROGRESS NOTES
[facility] Dosing Service  Warfarin (Coumadin) Subsequent Dosing      Bar Rick is a 29year old male for whom pharmacy has been dosing warfarin (Coumadin). Coumadin dosing is being done by Hematology/Oncology. Pharmacy signing off.     Edna

## 2019-09-14 NOTE — PLAN OF CARE
Assumed care @ 0700. Pt a/o x1-2, VSS. Neuro checks q4, no neuro changes. NIH 4. Tele SR. No acute respiratory distress noted. Denies any pain. Pt ambulated with 1-2 person with walker. INR 2.16 this AM.  Heparin gtt 5.5mL/hr per ACS protocol.   Ne artery perfusion - ex.  Angina  - Evaluate fluid balance, assess for edema, trend weights  Outcome: Progressing  Goal: Absence of cardiac arrhythmias or at baseline  Description  INTERVENTIONS:  - Continuous cardiac monitoring, monitor vital signs, obtain 1 medications to maintain glucose within target range  - Assess barriers to adequate nutritional intake and initiate nutrition consult as needed  - Instruct patient on self management of diabetes  Outcome: Progressing  Goal: Electrolytes maintained within no Initiate appropriate interventions as ordered  Outcome: Progressing  Goal: Absence of seizures  Description  INTERVENTIONS  - Monitor for seizure activity  - Administer anti-seizure medications as ordered  - Monitor neurological status  Outcome: Progressin basic wants and needs (use of communication/letter board/or smartphone/tablet/handwriting)  - Provide modeling for options to improve word retrieval in known context  - Allow additional time for processing after asking questions or providing instructions

## 2019-09-14 NOTE — PLAN OF CARE
Assumed care at [address] [place] [address]. Pt A&O X 1-2. Neuro checks q 4 hours, see flowsheets. Follows most commands, but at times does not. Impulsive and frequently attempts to get out of bed without calling for assistance. Bed alarm on medium sensitivity.    Ambulated i Monitor vital signs, rhythm, and trends  - Monitor for bleeding, hypotension and signs of decreased cardiac output  - Evaluate effectiveness of vasoactive medications to optimize hemodynamic stability  - Monitor arterial and/or venous puncture sites for bl Progressing     Problem: GENITOURINARY - ADULT  Goal: Absence of urinary retention  Description  INTERVENTIONS:  - Assess patient’s ability to void and empty bladder  - Monitor intake/output and perform bladder scan as needed  - Follow urinary retention pr sodium as indicated or ordered  - Monitor response to interventions for patient's volume status, including labs, urine output, blood pressure (other measures as available)  - Encourage oral intake as appropriate  - Instruct patient on fluid and nutrition r PT/OT  - Encourage visually impaired, hearing impaired and aphasic patients to use assistive/communication devices  - Allow ample time for patient to respond to questions d/t aphasia  - Encourage active ROM exercises to all extremities d/t weakness    9/14 asking questions or providing instructions   9/14/2019 0336 by Corey Das RN  Outcome: Progressing  9/14/2019 0333 by Corey Das RN  Outcome: Progressing     Problem: Impaired Activities of Daily Living  Goal: Achieve highest/safest level of

## 2019-09-16 ENCOUNTER — PATIENT OUTREACH (OUTPATIENT)
Dept: CASE MANAGEMENT | Age: 34
End: 2019-09-16

## 2019-09-16 DIAGNOSIS — I63.9 ISCHEMIC STROKE DIAGNOSED DURING CURRENT ADMISSION (HCC): ICD-10-CM

## 2019-09-16 PROCEDURE — 1111F DSCHRG MED/CURRENT MED MERGE: CPT

## 2019-09-17 NOTE — PROGRESS NOTES
Condition Update Post Discharge   Discharge Date: 9/14/19  Contact Date: 9/16/2019    Consent Verification:  Assessment Completed With: Patient  Caregiver: brother-in-law, Taisha Patel received per patient?  verbal  HIPAA Verified?   Yes leaving the hospital were your discharge instructions explained to you? yes   • How well were your discharge instructions explained to you at discharge?  o On a scale of 1-5   1- Very Poor and 5- Very well   o 4  • After our review today do you have any qu stroke?  Yes     Needs post D/C:   Now that you are home, are there any needs or concerns you need addressed before your next visit with your PCP?  (DME, meds, disease concerns, Etc): Yes, sister states they are struggling to care for pt right now at home a pt.  Will work with CM to coordinate any potential services for pt for PT/OT/ST. Inpt therapy all recommended rehab given severity of residual stroke effects.   Will work to find a potential inpt rehab facility and if none, will coordinate care with Bernard russell

## 2019-09-19 ENCOUNTER — TELEPHONE (OUTPATIENT)
Dept: HEMATOLOGY/ONCOLOGY | Facility: HOSPITAL | Age: 34
End: 2019-09-19

## 2019-09-19 DIAGNOSIS — D47.3 ESSENTIAL THROMBOCYTHEMIA (HCC): Primary | ICD-10-CM

## 2019-09-19 NOTE — TELEPHONE ENCOUNTER
Spoke to sister about follow up appointment for patient. She is having transportation/scheduling issues but reports she is able to come tomorrow afternoon at 3pm to see Dr. Florinda Urban. PSRs messaged.

## 2019-09-20 ENCOUNTER — OFFICE VISIT (OUTPATIENT)
Dept: HEMATOLOGY/ONCOLOGY | Facility: HOSPITAL | Age: 34
End: 2019-09-20
Attending: SPECIALIST

## 2019-09-20 ENCOUNTER — SOCIAL WORK SERVICES (OUTPATIENT)
Dept: HEMATOLOGY/ONCOLOGY | Facility: HOSPITAL | Age: 34
End: 2019-09-20

## 2019-09-20 ENCOUNTER — TELEPHONE (OUTPATIENT)
Dept: INTERNAL MEDICINE CLINIC | Facility: CLINIC | Age: 34
End: 2019-09-20

## 2019-09-20 VITALS
HEIGHT: 74 IN | TEMPERATURE: 98 F | RESPIRATION RATE: 16 BRPM | WEIGHT: 178 LBS | OXYGEN SATURATION: 99 % | SYSTOLIC BLOOD PRESSURE: 121 MMHG | BODY MASS INDEX: 22.84 KG/M2 | HEART RATE: 74 BPM | DIASTOLIC BLOOD PRESSURE: 78 MMHG

## 2019-09-20 DIAGNOSIS — D47.3 ESSENTIAL THROMBOCYTHEMIA (HCC): Primary | ICD-10-CM

## 2019-09-20 DIAGNOSIS — I63.9 ISCHEMIC STROKE DIAGNOSED DURING CURRENT ADMISSION (HCC): Primary | ICD-10-CM

## 2019-09-20 DIAGNOSIS — I63.39 CEREBROVASCULAR ACCIDENT (CVA) DUE TO THROMBOSIS OF OTHER CEREBRAL ARTERY (HCC): ICD-10-CM

## 2019-09-20 LAB — INR: 2.4 (ref 0.8–1.3)

## 2019-09-20 RX ORDER — ANAGRELIDE 0.5 MG/1
0.5 CAPSULE ORAL 2 TIMES DAILY
Qty: 60 CAPSULE | Refills: 0 | Status: SHIPPED | OUTPATIENT
Start: 2019-09-20

## 2019-09-20 NOTE — PROGRESS NOTES
Christal called and spoke with Sarita Blake [facility] who reported that his staff can do lab work with home health. He did report he has not yet received home health order. Order was obtained from Dr. Farzana Crystal and faxed to [facility] at [phone].

## 2019-09-20 NOTE — TELEPHONE ENCOUNTER
[facility] has agreed to see pt pro-juan. Still awaiting approval from possible Betty Rosenberg in [place]. Company :  [employer] :   NPI : [id number]  Tax ID : [id number]  Address: [address] [place] [address]

## 2019-09-20 NOTE — PATIENT INSTRUCTIONS
Patient is here today for follow up with Garo Berman for Thrombocythemia. Patient is on Warfarin and Hydrea 1000mg BID therapy. Patient denies pain. Stated is fatigued. Sister in law stated memory issues.  Medication list and medical history were reviewed an

## 2019-09-21 NOTE — PROGRESS NOTES
[facility] Progress Note      Patient Name: Rambo Cade   YOB: 1985  Medical Record Number: [id number]  Attending Physician: Matthew Sanchez. Mike Cruz M.D. Date of Visit: 9/20/2019      Chief Complaint  Essential thrombocythemia. mouth nightly. Disp: 30 tablet Rfl: 1   hydroxyurea 500 MG Oral Cap Take 2 capsules (1,000 mg total) by mouth 2 (two) times daily. Disp: 120 capsule Rfl: 0   Warfarin Sodium 5 MG Oral Tab Take 1.5 tablets (7.5 mg total) by mouth nightly.  Disp: 90 tablet Rf Granulocyte % 0.3 %   [place] [facility]    Collection Time: 09/20/19  3:36 PM   Result Value Ref Range    INR 2.4 (A) 0.8 - 1.3    INR Cartridge 51,208,246          Impression and Plan   1.    Essential thrombocytheima: Bone marrow biopsy should be performed

## 2019-09-23 ENCOUNTER — OFFICE VISIT (OUTPATIENT)
Dept: INTERNAL MEDICINE CLINIC | Facility: CLINIC | Age: 34
End: 2019-09-23

## 2019-09-23 VITALS
HEART RATE: 73 BPM | TEMPERATURE: 97 F | BODY MASS INDEX: 22.59 KG/M2 | HEIGHT: 74 IN | DIASTOLIC BLOOD PRESSURE: 74 MMHG | SYSTOLIC BLOOD PRESSURE: 124 MMHG | RESPIRATION RATE: 16 BRPM | WEIGHT: 176 LBS | OXYGEN SATURATION: 99 %

## 2019-09-23 DIAGNOSIS — G08 CEREBRAL VENOUS SINUS THROMBOSIS: ICD-10-CM

## 2019-09-23 DIAGNOSIS — D75.839 THROMBOCYTOSIS: ICD-10-CM

## 2019-09-23 DIAGNOSIS — I63.81 BASAL GANGLIA STROKE (HCC): ICD-10-CM

## 2019-09-23 DIAGNOSIS — I63.9 RECURRENT STROKES (HCC): ICD-10-CM

## 2019-09-23 DIAGNOSIS — I63.9 THALAMIC STROKE (HCC): Primary | ICD-10-CM

## 2019-09-23 PROCEDURE — 99214 OFFICE O/P EST MOD 30 MIN: CPT | Performed by: CLINICAL NURSE SPECIALIST

## 2019-09-23 NOTE — PROGRESS NOTES
[address]  TRANSITIONAL CARE CLINIC      HISTORY   CHIEF COMPLAINT: post hospital follow up visit  HPI: Eduardo Ivory is a 29year old male here today for follow up after being hospitalized for neurologic changes at home; aphasic, unabl past surgical history on file.    Family History   Problem Relation Age of Onset   • No Known Problems Father    • No Known Problems Mother       Social History    Tobacco Use      Smoking status: Never Smoker      Smokeless tobacco: Never Used    Alcohol u unable to determine orientation to place/thing given speech difficulty  PSYCHIATRIC: appropriate affect    ASSESSMENT/ PLAN:   1.  Thalamic stroke (HCC)/Basal ganglia stroke (HCC)/Recurrent strokes (HCC)/Cerebral venous sinus thrombosis  · Coumadin 7.5mg da

## 2019-09-23 NOTE — PROGRESS NOTES
TRANSITIONAL CARE CLINIC PHARMACIST MEDICATION RECONCILIATION        Carl Parrish MRN [id number]    1985 PCP None Pcp       Comments: Medication history completed in [address] [place] [address] by pharmacist with patient and sister in law.   Patient

## 2019-09-23 NOTE — PATIENT INSTRUCTIONS
Patient instructions:    1. Please obtain a pill box to help with remembering when to take medications. 2.  Watch for signs of bleeding and report if any. Aspirin, warfarin, and anagrelide can all increase your risk of bleeding.     3. Follow up with t

## 2019-09-30 ENCOUNTER — TELEPHONE (OUTPATIENT)
Dept: HEMATOLOGY/ONCOLOGY | Facility: HOSPITAL | Age: 34
End: 2019-09-30

## 2019-09-30 NOTE — TELEPHONE ENCOUNTER
Per home health - stated they had tried to contact patient family last week. Stated no appointment was scheduled  - will attempt today. Stated would have nurse call patient again today.

## 2019-10-01 ENCOUNTER — TELEPHONE (OUTPATIENT)
Dept: HEMATOLOGY/ONCOLOGY | Facility: HOSPITAL | Age: 34
End: 2019-10-01

## 2019-10-01 NOTE — TELEPHONE ENCOUNTER
Spoke with [employer] - has Anagrelide 0.5mg in stock - cost approximately $60.00 per month.

## 2019-10-02 ENCOUNTER — TELEPHONE (OUTPATIENT)
Dept: HEMATOLOGY/ONCOLOGY | Facility: HOSPITAL | Age: 34
End: 2019-10-02

## 2019-10-02 NOTE — TELEPHONE ENCOUNTER
MD Delbert Milligan, RN   Caller: Unspecified (Yesterday, 11:47 AM)             OK. Let's wait for him to get labs this week and go from there. If we don't hear anything, we will have to follow up at the end of the week.  I don't want h

## 2019-10-02 NOTE — TELEPHONE ENCOUNTER
Spoke with pharmacist yesterday. Was told they cannot obtain Anagrelide. Received another message today. Returned called [phone] has the Anagrelide 0.5mg. Was unable to reach patient - [facility] will cancel prescription.

## 2019-10-02 NOTE — TELEPHONE ENCOUNTER
Vinicius called in to follow up on a message that they had put in, in regards to the Anagrelide script. They stated they had sent a message in and had not heard back.  Please advise

## 2019-10-15 ENCOUNTER — TELEPHONE (OUTPATIENT)
Dept: HEMATOLOGY/ONCOLOGY | Facility: HOSPITAL | Age: 34
End: 2019-10-15

## 2019-10-15 NOTE — TELEPHONE ENCOUNTER
MD Dimple Jack RN             Is he getting labs via home health? Does he want to come to the cancer center to get labs? We have no idea what is going on.

## 2019-10-16 ENCOUNTER — TELEPHONE (OUTPATIENT)
Dept: NEUROLOGY | Facility: CLINIC | Age: 34
End: 2019-10-16

## 2019-12-05 NOTE — TELEPHONE ENCOUNTER
Left message on both  voicemail to see if patient plans to follow up with our office. Or if he has gone back home and is out of the [place]. Instructed to return call with update or to schedule an appointment.

## (undated) NOTE — Clinical Note
Your patient was recently seen at the [facility] [place] for a hospital follow-up visit. The visit note is attached. Please contact the clinic with any questions at [phone].     Thank you,  TR Ma

## (undated) NOTE — Clinical Note
Ervin Gong, this is the pt we spoke about earlier today. Will be coming in for [facility] appt on 9/23.  Matt [facility] agency that will accept the pt but I do not have the information yet on where to send the orders, etc.  Also, we have a potential option for inpt